# Patient Record
Sex: FEMALE | Race: WHITE | NOT HISPANIC OR LATINO | Employment: OTHER | URBAN - METROPOLITAN AREA
[De-identification: names, ages, dates, MRNs, and addresses within clinical notes are randomized per-mention and may not be internally consistent; named-entity substitution may affect disease eponyms.]

---

## 2024-02-25 ENCOUNTER — APPOINTMENT (OUTPATIENT)
Dept: CARDIOLOGY | Facility: HOSPITAL | Age: 84
End: 2024-02-25
Payer: MEDICARE

## 2024-02-25 ENCOUNTER — HOSPITAL ENCOUNTER (OUTPATIENT)
Facility: HOSPITAL | Age: 84
Setting detail: OBSERVATION
Discharge: HOME | End: 2024-02-27
Attending: STUDENT IN AN ORGANIZED HEALTH CARE EDUCATION/TRAINING PROGRAM | Admitting: INTERNAL MEDICINE
Payer: MEDICARE

## 2024-02-25 ENCOUNTER — APPOINTMENT (OUTPATIENT)
Dept: RADIOLOGY | Facility: HOSPITAL | Age: 84
End: 2024-02-25
Payer: MEDICARE

## 2024-02-25 DIAGNOSIS — R79.89 ELEVATED TROPONIN: ICD-10-CM

## 2024-02-25 DIAGNOSIS — R07.9 CHEST PAIN, UNSPECIFIED TYPE: Primary | ICD-10-CM

## 2024-02-25 DIAGNOSIS — I21.4 NSTEMI, INITIAL EPISODE OF CARE (MULTI): ICD-10-CM

## 2024-02-25 PROBLEM — N28.1 RENAL CYST: Status: ACTIVE | Noted: 2024-02-25

## 2024-02-25 PROBLEM — F32.A ANXIETY AND DEPRESSION: Status: ACTIVE | Noted: 2024-02-25

## 2024-02-25 PROBLEM — F41.9 ANXIETY AND DEPRESSION: Status: ACTIVE | Noted: 2024-02-25

## 2024-02-25 PROBLEM — I42.9 CARDIOMYOPATHY (MULTI): Status: ACTIVE | Noted: 2023-11-15

## 2024-02-25 PROBLEM — I35.0 AORTIC VALVE STENOSIS: Status: ACTIVE | Noted: 2022-05-18

## 2024-02-25 PROBLEM — N18.32 STAGE 3B CHRONIC KIDNEY DISEASE (MULTI): Status: ACTIVE | Noted: 2022-05-18

## 2024-02-25 PROBLEM — K57.90 DIVERTICULOSIS: Status: ACTIVE | Noted: 2023-10-12

## 2024-02-25 PROBLEM — I25.10 CORONARY ARTERY DISEASE INVOLVING NATIVE CORONARY ARTERY OF NATIVE HEART WITHOUT ANGINA PECTORIS: Status: ACTIVE | Noted: 2023-11-15

## 2024-02-25 LAB
ALBUMIN SERPL BCP-MCNC: 4.4 G/DL (ref 3.4–5)
ALP SERPL-CCNC: 71 U/L (ref 33–136)
ALT SERPL W P-5'-P-CCNC: 25 U/L (ref 7–45)
ANION GAP SERPL CALC-SCNC: 15 MMOL/L (ref 10–20)
AST SERPL W P-5'-P-CCNC: 25 U/L (ref 9–39)
BASOPHILS # BLD AUTO: 0.02 X10*3/UL (ref 0–0.1)
BASOPHILS NFR BLD AUTO: 0.3 %
BILIRUB SERPL-MCNC: 0.6 MG/DL (ref 0–1.2)
BNP SERPL-MCNC: 538 PG/ML (ref 0–99)
BUN SERPL-MCNC: 27 MG/DL (ref 6–23)
CALCIUM SERPL-MCNC: 10.5 MG/DL (ref 8.6–10.3)
CARDIAC TROPONIN I PNL SERPL HS: 16 NG/L (ref 0–13)
CARDIAC TROPONIN I PNL SERPL HS: 18 NG/L (ref 0–13)
CHLORIDE SERPL-SCNC: 103 MMOL/L (ref 98–107)
CO2 SERPL-SCNC: 22 MMOL/L (ref 21–32)
CREAT SERPL-MCNC: 1.18 MG/DL (ref 0.5–1.05)
EGFRCR SERPLBLD CKD-EPI 2021: 45 ML/MIN/1.73M*2
EOSINOPHIL # BLD AUTO: 0.11 X10*3/UL (ref 0–0.4)
EOSINOPHIL NFR BLD AUTO: 1.4 %
ERYTHROCYTE [DISTWIDTH] IN BLOOD BY AUTOMATED COUNT: 12.5 % (ref 11.5–14.5)
FLUAV RNA RESP QL NAA+PROBE: NOT DETECTED
FLUBV RNA RESP QL NAA+PROBE: NOT DETECTED
GLUCOSE SERPL-MCNC: 118 MG/DL (ref 74–99)
HCT VFR BLD AUTO: 40.6 % (ref 36–46)
HGB BLD-MCNC: 13.5 G/DL (ref 12–16)
IMM GRANULOCYTES # BLD AUTO: 0.01 X10*3/UL (ref 0–0.5)
IMM GRANULOCYTES NFR BLD AUTO: 0.1 % (ref 0–0.9)
INR PPP: 1.1 (ref 0.9–1.1)
LIPASE SERPL-CCNC: 26 U/L (ref 9–82)
LYMPHOCYTES # BLD AUTO: 1.18 X10*3/UL (ref 0.8–3)
LYMPHOCYTES NFR BLD AUTO: 15.4 %
MAGNESIUM SERPL-MCNC: 1.79 MG/DL (ref 1.6–2.4)
MCH RBC QN AUTO: 29.1 PG (ref 26–34)
MCHC RBC AUTO-ENTMCNC: 33.3 G/DL (ref 32–36)
MCV RBC AUTO: 88 FL (ref 80–100)
MONOCYTES # BLD AUTO: 0.46 X10*3/UL (ref 0.05–0.8)
MONOCYTES NFR BLD AUTO: 6 %
NEUTROPHILS # BLD AUTO: 5.87 X10*3/UL (ref 1.6–5.5)
NEUTROPHILS NFR BLD AUTO: 76.8 %
NRBC BLD-RTO: 0 /100 WBCS (ref 0–0)
PLATELET # BLD AUTO: 223 X10*3/UL (ref 150–450)
POTASSIUM SERPL-SCNC: 4.2 MMOL/L (ref 3.5–5.3)
PROT SERPL-MCNC: 7.5 G/DL (ref 6.4–8.2)
PROTHROMBIN TIME: 12.4 SECONDS (ref 9.8–12.8)
RBC # BLD AUTO: 4.64 X10*6/UL (ref 4–5.2)
SARS-COV-2 RNA RESP QL NAA+PROBE: NOT DETECTED
SODIUM SERPL-SCNC: 136 MMOL/L (ref 136–145)
WBC # BLD AUTO: 7.7 X10*3/UL (ref 4.4–11.3)

## 2024-02-25 PROCEDURE — 80053 COMPREHEN METABOLIC PANEL: CPT | Performed by: STUDENT IN AN ORGANIZED HEALTH CARE EDUCATION/TRAINING PROGRAM

## 2024-02-25 PROCEDURE — 2500000001 HC RX 250 WO HCPCS SELF ADMINISTERED DRUGS (ALT 637 FOR MEDICARE OP): Performed by: STUDENT IN AN ORGANIZED HEALTH CARE EDUCATION/TRAINING PROGRAM

## 2024-02-25 PROCEDURE — 99285 EMERGENCY DEPT VISIT HI MDM: CPT | Mod: 25

## 2024-02-25 PROCEDURE — 93005 ELECTROCARDIOGRAM TRACING: CPT

## 2024-02-25 PROCEDURE — 96375 TX/PRO/DX INJ NEW DRUG ADDON: CPT

## 2024-02-25 PROCEDURE — 2500000004 HC RX 250 GENERAL PHARMACY W/ HCPCS (ALT 636 FOR OP/ED): Performed by: INTERNAL MEDICINE

## 2024-02-25 PROCEDURE — G0378 HOSPITAL OBSERVATION PER HR: HCPCS

## 2024-02-25 PROCEDURE — 36415 COLL VENOUS BLD VENIPUNCTURE: CPT | Performed by: STUDENT IN AN ORGANIZED HEALTH CARE EDUCATION/TRAINING PROGRAM

## 2024-02-25 PROCEDURE — 85610 PROTHROMBIN TIME: CPT | Performed by: STUDENT IN AN ORGANIZED HEALTH CARE EDUCATION/TRAINING PROGRAM

## 2024-02-25 PROCEDURE — 83690 ASSAY OF LIPASE: CPT | Performed by: STUDENT IN AN ORGANIZED HEALTH CARE EDUCATION/TRAINING PROGRAM

## 2024-02-25 PROCEDURE — 84484 ASSAY OF TROPONIN QUANT: CPT | Performed by: STUDENT IN AN ORGANIZED HEALTH CARE EDUCATION/TRAINING PROGRAM

## 2024-02-25 PROCEDURE — 99223 1ST HOSP IP/OBS HIGH 75: CPT | Performed by: INTERNAL MEDICINE

## 2024-02-25 PROCEDURE — 87636 SARSCOV2 & INF A&B AMP PRB: CPT | Performed by: STUDENT IN AN ORGANIZED HEALTH CARE EDUCATION/TRAINING PROGRAM

## 2024-02-25 PROCEDURE — 83880 ASSAY OF NATRIURETIC PEPTIDE: CPT | Performed by: STUDENT IN AN ORGANIZED HEALTH CARE EDUCATION/TRAINING PROGRAM

## 2024-02-25 PROCEDURE — 71045 X-RAY EXAM CHEST 1 VIEW: CPT

## 2024-02-25 PROCEDURE — 71045 X-RAY EXAM CHEST 1 VIEW: CPT | Mod: FOREIGN READ | Performed by: RADIOLOGY

## 2024-02-25 PROCEDURE — 85025 COMPLETE CBC W/AUTO DIFF WBC: CPT | Performed by: STUDENT IN AN ORGANIZED HEALTH CARE EDUCATION/TRAINING PROGRAM

## 2024-02-25 PROCEDURE — 83735 ASSAY OF MAGNESIUM: CPT | Performed by: STUDENT IN AN ORGANIZED HEALTH CARE EDUCATION/TRAINING PROGRAM

## 2024-02-25 RX ORDER — PANTOPRAZOLE SODIUM 40 MG/1
40 TABLET, DELAYED RELEASE ORAL DAILY
Status: DISCONTINUED | OUTPATIENT
Start: 2024-02-26 | End: 2024-02-27 | Stop reason: HOSPADM

## 2024-02-25 RX ORDER — NAPROXEN SODIUM 220 MG/1
1 TABLET, FILM COATED ORAL
COMMUNITY
Start: 2023-05-23

## 2024-02-25 RX ORDER — FLUCONAZOLE 200 MG/1
1 TABLET ORAL DAILY
Status: ON HOLD | COMMUNITY
End: 2024-02-26 | Stop reason: ALTCHOICE

## 2024-02-25 RX ORDER — AMLODIPINE BESYLATE 5 MG/1
1 TABLET ORAL DAILY
Status: ON HOLD | COMMUNITY
End: 2024-02-26 | Stop reason: SDUPTHER

## 2024-02-25 RX ORDER — NAPROXEN SODIUM 220 MG/1
81 TABLET, FILM COATED ORAL
Status: DISCONTINUED | OUTPATIENT
Start: 2024-02-26 | End: 2024-02-27 | Stop reason: HOSPADM

## 2024-02-25 RX ORDER — HEPARIN SODIUM 5000 [USP'U]/ML
60 INJECTION, SOLUTION INTRAVENOUS; SUBCUTANEOUS ONCE
Status: COMPLETED | OUTPATIENT
Start: 2024-02-25 | End: 2024-02-25

## 2024-02-25 RX ORDER — AMLODIPINE BESYLATE 2.5 MG/1
1 TABLET ORAL NIGHTLY
COMMUNITY
Start: 2022-07-18

## 2024-02-25 RX ORDER — SPIRONOLACTONE 50 MG/1
1 TABLET, FILM COATED ORAL 2 TIMES DAILY
Status: ON HOLD | COMMUNITY
End: 2024-02-26 | Stop reason: ALTCHOICE

## 2024-02-25 RX ORDER — HYDRALAZINE HYDROCHLORIDE 20 MG/ML
10 INJECTION INTRAMUSCULAR; INTRAVENOUS EVERY 4 HOURS PRN
Status: DISCONTINUED | OUTPATIENT
Start: 2024-02-25 | End: 2024-02-27 | Stop reason: HOSPADM

## 2024-02-25 RX ORDER — MAGNESIUM SULFATE 1 G/100ML
1 INJECTION INTRAVENOUS ONCE
Status: COMPLETED | OUTPATIENT
Start: 2024-02-25 | End: 2024-02-26

## 2024-02-25 RX ORDER — HEPARIN SODIUM 10000 [USP'U]/100ML
0-4000 INJECTION, SOLUTION INTRAVENOUS CONTINUOUS
Status: DISCONTINUED | OUTPATIENT
Start: 2024-02-25 | End: 2024-02-26

## 2024-02-25 RX ORDER — ONDANSETRON HYDROCHLORIDE 2 MG/ML
4 INJECTION, SOLUTION INTRAVENOUS EVERY 8 HOURS PRN
Status: DISCONTINUED | OUTPATIENT
Start: 2024-02-25 | End: 2024-02-27 | Stop reason: HOSPADM

## 2024-02-25 RX ORDER — HYDROXYZINE HYDROCHLORIDE 25 MG/1
1 TABLET, FILM COATED ORAL NIGHTLY
COMMUNITY

## 2024-02-25 RX ORDER — SPIRONOLACTONE 25 MG/1
50 TABLET ORAL 2 TIMES DAILY
Status: DISCONTINUED | OUTPATIENT
Start: 2024-02-25 | End: 2024-02-26

## 2024-02-25 RX ORDER — VENLAFAXINE HYDROCHLORIDE 150 MG/1
1 CAPSULE, EXTENDED RELEASE ORAL DAILY
Status: ON HOLD | COMMUNITY
End: 2024-02-26 | Stop reason: ALTCHOICE

## 2024-02-25 RX ORDER — PRAVASTATIN SODIUM 10 MG/1
10 TABLET ORAL NIGHTLY
Status: DISCONTINUED | OUTPATIENT
Start: 2024-02-25 | End: 2024-02-26

## 2024-02-25 RX ORDER — ACETAMINOPHEN 160 MG/5ML
650 SOLUTION ORAL EVERY 4 HOURS PRN
Status: DISCONTINUED | OUTPATIENT
Start: 2024-02-25 | End: 2024-02-27 | Stop reason: HOSPADM

## 2024-02-25 RX ORDER — PRAVASTATIN SODIUM 10 MG/1
1 TABLET ORAL NIGHTLY
Status: ON HOLD | COMMUNITY
End: 2024-02-26 | Stop reason: ALTCHOICE

## 2024-02-25 RX ORDER — TALC
3 POWDER (GRAM) TOPICAL DAILY
Status: DISCONTINUED | OUTPATIENT
Start: 2024-02-25 | End: 2024-02-27 | Stop reason: HOSPADM

## 2024-02-25 RX ORDER — PANTOPRAZOLE SODIUM 40 MG/10ML
40 INJECTION, POWDER, LYOPHILIZED, FOR SOLUTION INTRAVENOUS DAILY
Status: DISCONTINUED | OUTPATIENT
Start: 2024-02-26 | End: 2024-02-27 | Stop reason: HOSPADM

## 2024-02-25 RX ORDER — PANTOPRAZOLE SODIUM 40 MG/1
40 TABLET, DELAYED RELEASE ORAL
Status: ON HOLD | COMMUNITY
Start: 2023-05-22 | End: 2024-02-26 | Stop reason: ALTCHOICE

## 2024-02-25 RX ORDER — ONDANSETRON 4 MG/1
4 TABLET, FILM COATED ORAL EVERY 8 HOURS PRN
Status: DISCONTINUED | OUTPATIENT
Start: 2024-02-25 | End: 2024-02-27 | Stop reason: HOSPADM

## 2024-02-25 RX ORDER — LOSARTAN POTASSIUM 25 MG/1
25 TABLET ORAL
Status: DISCONTINUED | OUTPATIENT
Start: 2024-02-26 | End: 2024-02-27 | Stop reason: HOSPADM

## 2024-02-25 RX ORDER — LORAZEPAM 1 MG/1
1 TABLET ORAL 2 TIMES DAILY PRN
Status: DISCONTINUED | OUTPATIENT
Start: 2024-02-25 | End: 2024-02-27 | Stop reason: HOSPADM

## 2024-02-25 RX ORDER — ACETAMINOPHEN 650 MG/1
650 SUPPOSITORY RECTAL EVERY 4 HOURS PRN
Status: DISCONTINUED | OUTPATIENT
Start: 2024-02-25 | End: 2024-02-27 | Stop reason: HOSPADM

## 2024-02-25 RX ORDER — LOSARTAN POTASSIUM 25 MG/1
25 TABLET ORAL
COMMUNITY
Start: 2024-01-17 | End: 2025-01-16

## 2024-02-25 RX ORDER — METOPROLOL SUCCINATE 50 MG/1
50 TABLET, EXTENDED RELEASE ORAL ONCE
Status: COMPLETED | OUTPATIENT
Start: 2024-02-25 | End: 2024-02-25

## 2024-02-25 RX ORDER — AMLODIPINE BESYLATE 2.5 MG/1
2.5 TABLET ORAL
Status: ON HOLD | COMMUNITY
Start: 2024-01-12 | End: 2024-02-26 | Stop reason: SDUPTHER

## 2024-02-25 RX ORDER — AMLODIPINE BESYLATE 5 MG/1
2.5 TABLET ORAL ONCE
Status: COMPLETED | OUTPATIENT
Start: 2024-02-25 | End: 2024-02-25

## 2024-02-25 RX ORDER — POLYETHYLENE GLYCOL 3350 17 G/17G
17 POWDER, FOR SOLUTION ORAL DAILY
Status: DISCONTINUED | OUTPATIENT
Start: 2024-02-25 | End: 2024-02-27 | Stop reason: HOSPADM

## 2024-02-25 RX ORDER — HEPARIN SODIUM 5000 [USP'U]/ML
INJECTION, SOLUTION INTRAVENOUS; SUBCUTANEOUS EVERY 4 HOURS PRN
Status: DISCONTINUED | OUTPATIENT
Start: 2024-02-25 | End: 2024-02-26

## 2024-02-25 RX ORDER — EZETIMIBE 10 MG/1
10 TABLET ORAL
Status: DISCONTINUED | OUTPATIENT
Start: 2024-02-26 | End: 2024-02-27 | Stop reason: HOSPADM

## 2024-02-25 RX ORDER — EZETIMIBE 10 MG/1
10 TABLET ORAL
COMMUNITY
Start: 2024-01-09

## 2024-02-25 RX ORDER — ACETAMINOPHEN 325 MG/1
650 TABLET ORAL EVERY 4 HOURS PRN
Status: DISCONTINUED | OUTPATIENT
Start: 2024-02-25 | End: 2024-02-27 | Stop reason: HOSPADM

## 2024-02-25 RX ORDER — LORAZEPAM 1 MG/1
1 TABLET ORAL 2 TIMES DAILY PRN
COMMUNITY
Start: 2023-06-20

## 2024-02-25 RX ORDER — FINASTERIDE 5 MG/1
5 TABLET, FILM COATED ORAL DAILY
Status: DISCONTINUED | OUTPATIENT
Start: 2024-02-25 | End: 2024-02-26

## 2024-02-25 RX ORDER — AMLODIPINE BESYLATE 5 MG/1
10 TABLET ORAL NIGHTLY
Status: DISCONTINUED | OUTPATIENT
Start: 2024-02-25 | End: 2024-02-27 | Stop reason: HOSPADM

## 2024-02-25 RX ORDER — VENLAFAXINE HYDROCHLORIDE 150 MG/1
150 CAPSULE, EXTENDED RELEASE ORAL DAILY
Status: DISCONTINUED | OUTPATIENT
Start: 2024-02-25 | End: 2024-02-26

## 2024-02-25 RX ORDER — HYDROXYZINE HYDROCHLORIDE 25 MG/1
25 TABLET, FILM COATED ORAL NIGHTLY
Status: DISCONTINUED | OUTPATIENT
Start: 2024-02-25 | End: 2024-02-27

## 2024-02-25 RX ORDER — FINASTERIDE 5 MG/1
1 TABLET, FILM COATED ORAL DAILY
Status: ON HOLD | COMMUNITY
End: 2024-02-26 | Stop reason: ALTCHOICE

## 2024-02-25 RX ORDER — CLINDAMYCIN HYDROCHLORIDE 300 MG/1
1 CAPSULE ORAL 4 TIMES DAILY
Status: ON HOLD | COMMUNITY
End: 2024-02-26 | Stop reason: ALTCHOICE

## 2024-02-25 RX ORDER — METOPROLOL SUCCINATE 50 MG/1
50 TABLET, EXTENDED RELEASE ORAL
COMMUNITY
Start: 2024-01-05 | End: 2024-02-27 | Stop reason: HOSPADM

## 2024-02-25 RX ORDER — BUSPIRONE HYDROCHLORIDE 15 MG/1
15 TABLET ORAL 2 TIMES DAILY
COMMUNITY
Start: 2023-10-12

## 2024-02-25 RX ORDER — NAPROXEN SODIUM 220 MG/1
324 TABLET, FILM COATED ORAL ONCE
Status: COMPLETED | OUTPATIENT
Start: 2024-02-25 | End: 2024-02-25

## 2024-02-25 RX ORDER — LOSARTAN POTASSIUM 25 MG/1
25 TABLET ORAL ONCE
Status: COMPLETED | OUTPATIENT
Start: 2024-02-25 | End: 2024-02-25

## 2024-02-25 RX ORDER — BUSPIRONE HYDROCHLORIDE 5 MG/1
15 TABLET ORAL 2 TIMES DAILY
Status: DISCONTINUED | OUTPATIENT
Start: 2024-02-25 | End: 2024-02-27 | Stop reason: HOSPADM

## 2024-02-25 RX ORDER — METOPROLOL SUCCINATE 50 MG/1
50 TABLET, EXTENDED RELEASE ORAL
Status: DISCONTINUED | OUTPATIENT
Start: 2024-02-26 | End: 2024-02-26

## 2024-02-25 RX ORDER — DULOXETIN HYDROCHLORIDE 20 MG/1
20 CAPSULE, DELAYED RELEASE ORAL
COMMUNITY
Start: 2024-01-05 | End: 2024-02-27 | Stop reason: HOSPADM

## 2024-02-25 RX ADMIN — LOSARTAN POTASSIUM 25 MG: 25 TABLET, FILM COATED ORAL at 19:50

## 2024-02-25 RX ADMIN — AMLODIPINE BESYLATE 2.5 MG: 5 TABLET ORAL at 19:50

## 2024-02-25 RX ADMIN — ASPIRIN 324 MG: 81 TABLET, CHEWABLE ORAL at 20:54

## 2024-02-25 RX ADMIN — HEPARIN SODIUM 3550 UNITS: 5000 INJECTION INTRAVENOUS; SUBCUTANEOUS at 21:00

## 2024-02-25 RX ADMIN — METOPROLOL SUCCINATE 50 MG: 50 TABLET, EXTENDED RELEASE ORAL at 19:50

## 2024-02-25 ASSESSMENT — HEART SCORE
TROPONIN: 1-3 TIMES NORMAL LIMIT
HISTORY: SLIGHTLY SUSPICIOUS
RISK FACTORS: >2 RISK FACTORS OR HX OF ATHEROSCLEROTIC DISEASE
ECG: NORMAL
AGE: 65+
HEART SCORE: 5

## 2024-02-25 ASSESSMENT — LIFESTYLE VARIABLES
EVER HAD A DRINK FIRST THING IN THE MORNING TO STEADY YOUR NERVES TO GET RID OF A HANGOVER: NO
HAVE YOU EVER FELT YOU SHOULD CUT DOWN ON YOUR DRINKING: NO
EVER FELT BAD OR GUILTY ABOUT YOUR DRINKING: NO
HAVE PEOPLE ANNOYED YOU BY CRITICIZING YOUR DRINKING: NO

## 2024-02-25 ASSESSMENT — COLUMBIA-SUICIDE SEVERITY RATING SCALE - C-SSRS
1. IN THE PAST MONTH, HAVE YOU WISHED YOU WERE DEAD OR WISHED YOU COULD GO TO SLEEP AND NOT WAKE UP?: NO
6. HAVE YOU EVER DONE ANYTHING, STARTED TO DO ANYTHING, OR PREPARED TO DO ANYTHING TO END YOUR LIFE?: NO
2. HAVE YOU ACTUALLY HAD ANY THOUGHTS OF KILLING YOURSELF?: NO

## 2024-02-25 ASSESSMENT — PAIN - FUNCTIONAL ASSESSMENT: PAIN_FUNCTIONAL_ASSESSMENT: 0-10

## 2024-02-25 ASSESSMENT — PAIN SCALES - GENERAL: PAINLEVEL_OUTOF10: 0 - NO PAIN

## 2024-02-26 ENCOUNTER — APPOINTMENT (OUTPATIENT)
Dept: CARDIOLOGY | Facility: HOSPITAL | Age: 84
End: 2024-02-26
Payer: MEDICARE

## 2024-02-26 LAB
ALBUMIN SERPL BCP-MCNC: 3.7 G/DL (ref 3.4–5)
ALP SERPL-CCNC: 62 U/L (ref 33–136)
ALT SERPL W P-5'-P-CCNC: 19 U/L (ref 7–45)
ANION GAP SERPL CALC-SCNC: 11 MMOL/L (ref 10–20)
AST SERPL W P-5'-P-CCNC: 20 U/L (ref 9–39)
BILIRUB SERPL-MCNC: 0.4 MG/DL (ref 0–1.2)
BUN SERPL-MCNC: 27 MG/DL (ref 6–23)
CALCIUM SERPL-MCNC: 9.3 MG/DL (ref 8.6–10.3)
CARDIAC TROPONIN I PNL SERPL HS: 17 NG/L (ref 0–13)
CHLORIDE SERPL-SCNC: 105 MMOL/L (ref 98–107)
CO2 SERPL-SCNC: 24 MMOL/L (ref 21–32)
CREAT SERPL-MCNC: 1.2 MG/DL (ref 0.5–1.05)
EGFRCR SERPLBLD CKD-EPI 2021: 44 ML/MIN/1.73M*2
ERYTHROCYTE [DISTWIDTH] IN BLOOD BY AUTOMATED COUNT: 12.5 % (ref 11.5–14.5)
GLUCOSE SERPL-MCNC: 124 MG/DL (ref 74–99)
HCT VFR BLD AUTO: 35.5 % (ref 36–46)
HGB BLD-MCNC: 11.7 G/DL (ref 12–16)
HOLD SPECIMEN: NORMAL
MCH RBC QN AUTO: 28.8 PG (ref 26–34)
MCHC RBC AUTO-ENTMCNC: 33 G/DL (ref 32–36)
MCV RBC AUTO: 87 FL (ref 80–100)
NRBC BLD-RTO: 0 /100 WBCS (ref 0–0)
PLATELET # BLD AUTO: 192 X10*3/UL (ref 150–450)
POTASSIUM SERPL-SCNC: 3.5 MMOL/L (ref 3.5–5.3)
PROT SERPL-MCNC: 6.3 G/DL (ref 6.4–8.2)
RBC # BLD AUTO: 4.06 X10*6/UL (ref 4–5.2)
SODIUM SERPL-SCNC: 136 MMOL/L (ref 136–145)
UFH PPP CHRO-ACNC: 0.3 IU/ML
UFH PPP CHRO-ACNC: 0.5 IU/ML
WBC # BLD AUTO: 5.2 X10*3/UL (ref 4.4–11.3)

## 2024-02-26 PROCEDURE — 84484 ASSAY OF TROPONIN QUANT: CPT | Performed by: HOSPITALIST

## 2024-02-26 PROCEDURE — 96365 THER/PROPH/DIAG IV INF INIT: CPT

## 2024-02-26 PROCEDURE — 36415 COLL VENOUS BLD VENIPUNCTURE: CPT | Performed by: INTERNAL MEDICINE

## 2024-02-26 PROCEDURE — 2500000004 HC RX 250 GENERAL PHARMACY W/ HCPCS (ALT 636 FOR OP/ED): Performed by: INTERNAL MEDICINE

## 2024-02-26 PROCEDURE — 96375 TX/PRO/DX INJ NEW DRUG ADDON: CPT

## 2024-02-26 PROCEDURE — 96366 THER/PROPH/DIAG IV INF ADDON: CPT

## 2024-02-26 PROCEDURE — 2500000001 HC RX 250 WO HCPCS SELF ADMINISTERED DRUGS (ALT 637 FOR MEDICARE OP): Performed by: NURSE PRACTITIONER

## 2024-02-26 PROCEDURE — 2500000004 HC RX 250 GENERAL PHARMACY W/ HCPCS (ALT 636 FOR OP/ED): Performed by: NURSE PRACTITIONER

## 2024-02-26 PROCEDURE — 85520 HEPARIN ASSAY: CPT | Performed by: INTERNAL MEDICINE

## 2024-02-26 PROCEDURE — 2500000001 HC RX 250 WO HCPCS SELF ADMINISTERED DRUGS (ALT 637 FOR MEDICARE OP): Performed by: INTERNAL MEDICINE

## 2024-02-26 PROCEDURE — 2500000002 HC RX 250 W HCPCS SELF ADMINISTERED DRUGS (ALT 637 FOR MEDICARE OP, ALT 636 FOR OP/ED): Performed by: HOSPITALIST

## 2024-02-26 PROCEDURE — 99223 1ST HOSP IP/OBS HIGH 75: CPT | Performed by: INTERNAL MEDICINE

## 2024-02-26 PROCEDURE — 93005 ELECTROCARDIOGRAM TRACING: CPT

## 2024-02-26 PROCEDURE — 80053 COMPREHEN METABOLIC PANEL: CPT | Performed by: INTERNAL MEDICINE

## 2024-02-26 PROCEDURE — 99232 SBSQ HOSP IP/OBS MODERATE 35: CPT | Performed by: HOSPITALIST

## 2024-02-26 PROCEDURE — 96376 TX/PRO/DX INJ SAME DRUG ADON: CPT

## 2024-02-26 PROCEDURE — G0378 HOSPITAL OBSERVATION PER HR: HCPCS

## 2024-02-26 PROCEDURE — 85027 COMPLETE CBC AUTOMATED: CPT | Performed by: INTERNAL MEDICINE

## 2024-02-26 PROCEDURE — C9113 INJ PANTOPRAZOLE SODIUM, VIA: HCPCS | Performed by: INTERNAL MEDICINE

## 2024-02-26 PROCEDURE — 93010 ELECTROCARDIOGRAM REPORT: CPT | Performed by: INTERNAL MEDICINE

## 2024-02-26 PROCEDURE — 2500000001 HC RX 250 WO HCPCS SELF ADMINISTERED DRUGS (ALT 637 FOR MEDICARE OP): Performed by: HOSPITALIST

## 2024-02-26 PROCEDURE — 2500000002 HC RX 250 W HCPCS SELF ADMINISTERED DRUGS (ALT 637 FOR MEDICARE OP, ALT 636 FOR OP/ED): Performed by: INTERNAL MEDICINE

## 2024-02-26 RX ORDER — SPIRONOLACTONE 25 MG/1
12.5 TABLET ORAL
Status: DISCONTINUED | OUTPATIENT
Start: 2024-02-26 | End: 2024-02-27 | Stop reason: HOSPADM

## 2024-02-26 RX ORDER — METOPROLOL SUCCINATE 25 MG/1
25 TABLET, EXTENDED RELEASE ORAL
Status: DISCONTINUED | OUTPATIENT
Start: 2024-02-27 | End: 2024-02-27 | Stop reason: HOSPADM

## 2024-02-26 RX ORDER — AMINOPHYLLINE 25 MG/ML
125 INJECTION, SOLUTION INTRAVENOUS AS NEEDED
Status: CANCELLED | OUTPATIENT
Start: 2024-02-26

## 2024-02-26 RX ORDER — METOPROLOL SUCCINATE 25 MG/1
25 TABLET, EXTENDED RELEASE ORAL ONCE
Status: DISCONTINUED | OUTPATIENT
Start: 2024-02-26 | End: 2024-02-27 | Stop reason: HOSPADM

## 2024-02-26 RX ORDER — DULOXETIN HYDROCHLORIDE 20 MG/1
20 CAPSULE, DELAYED RELEASE ORAL DAILY
Status: DISCONTINUED | OUTPATIENT
Start: 2024-02-26 | End: 2024-02-27 | Stop reason: HOSPADM

## 2024-02-26 RX ORDER — NAPROXEN SODIUM 220 MG/1
81 TABLET, FILM COATED ORAL ONCE
Status: COMPLETED | OUTPATIENT
Start: 2024-02-26 | End: 2024-02-26

## 2024-02-26 RX ORDER — EZETIMIBE 10 MG/1
10 TABLET ORAL ONCE
Status: COMPLETED | OUTPATIENT
Start: 2024-02-26 | End: 2024-02-26

## 2024-02-26 RX ORDER — LOSARTAN POTASSIUM 25 MG/1
25 TABLET ORAL ONCE
Status: COMPLETED | OUTPATIENT
Start: 2024-02-26 | End: 2024-02-26

## 2024-02-26 RX ORDER — METOPROLOL SUCCINATE 25 MG/1
25 TABLET, EXTENDED RELEASE ORAL ONCE
Status: COMPLETED | OUTPATIENT
Start: 2024-02-26 | End: 2024-02-26

## 2024-02-26 RX ORDER — SPIRONOLACTONE 25 MG/1
12.5 TABLET ORAL ONCE
Status: COMPLETED | OUTPATIENT
Start: 2024-02-26 | End: 2024-02-26

## 2024-02-26 RX ORDER — ROSUVASTATIN CALCIUM 10 MG/1
10 TABLET, COATED ORAL NIGHTLY
Status: DISCONTINUED | OUTPATIENT
Start: 2024-02-26 | End: 2024-02-27 | Stop reason: HOSPADM

## 2024-02-26 RX ORDER — ISOSORBIDE MONONITRATE 30 MG/1
30 TABLET, EXTENDED RELEASE ORAL DAILY
Status: DISCONTINUED | OUTPATIENT
Start: 2024-02-26 | End: 2024-02-27 | Stop reason: HOSPADM

## 2024-02-26 RX ORDER — DULOXETIN HYDROCHLORIDE 20 MG/1
20 CAPSULE, DELAYED RELEASE ORAL ONCE
Status: COMPLETED | OUTPATIENT
Start: 2024-02-26 | End: 2024-02-26

## 2024-02-26 RX ORDER — MAGNESIUM SULFATE HEPTAHYDRATE 40 MG/ML
2 INJECTION, SOLUTION INTRAVENOUS ONCE
Status: COMPLETED | OUTPATIENT
Start: 2024-02-26 | End: 2024-02-26

## 2024-02-26 RX ORDER — ROSUVASTATIN CALCIUM 10 MG/1
10 TABLET, COATED ORAL DAILY
COMMUNITY

## 2024-02-26 RX ADMIN — AMLODIPINE BESYLATE 10 MG: 5 TABLET ORAL at 21:58

## 2024-02-26 RX ADMIN — DULOXETINE HYDROCHLORIDE 20 MG: 20 CAPSULE, DELAYED RELEASE ORAL at 21:58

## 2024-02-26 RX ADMIN — ACETAMINOPHEN 650 MG: 325 TABLET ORAL at 21:58

## 2024-02-26 RX ADMIN — MAGNESIUM SULFATE HEPTAHYDRATE 2 G: 40 INJECTION, SOLUTION INTRAVENOUS at 09:52

## 2024-02-26 RX ADMIN — ACETAMINOPHEN 650 MG: 325 TABLET ORAL at 10:09

## 2024-02-26 RX ADMIN — ROSUVASTATIN CALCIUM 10 MG: 10 TABLET, FILM COATED ORAL at 21:58

## 2024-02-26 RX ADMIN — LOSARTAN POTASSIUM 25 MG: 25 TABLET, FILM COATED ORAL at 21:57

## 2024-02-26 RX ADMIN — MAGNESIUM SULFATE HEPTAHYDRATE 1 G: 1 INJECTION, SOLUTION INTRAVENOUS at 04:49

## 2024-02-26 RX ADMIN — SPIRONOLACTONE 12.5 MG: 25 TABLET ORAL at 21:58

## 2024-02-26 RX ADMIN — BUSPIRONE HYDROCHLORIDE 15 MG: 5 TABLET ORAL at 00:36

## 2024-02-26 RX ADMIN — HYDROXYZINE HYDROCHLORIDE 25 MG: 25 TABLET ORAL at 21:58

## 2024-02-26 RX ADMIN — METOPROLOL SUCCINATE 25 MG: 25 TABLET, EXTENDED RELEASE ORAL at 22:01

## 2024-02-26 RX ADMIN — PANTOPRAZOLE SODIUM 40 MG: 40 INJECTION, POWDER, FOR SOLUTION INTRAVENOUS at 06:42

## 2024-02-26 RX ADMIN — ASPIRIN 81 MG: 81 TABLET, CHEWABLE ORAL at 21:58

## 2024-02-26 RX ADMIN — HEPARIN SODIUM 700 UNITS/HR: 10000 INJECTION, SOLUTION INTRAVENOUS at 00:35

## 2024-02-26 RX ADMIN — EZETIMIBE 10 MG: 10 TABLET ORAL at 21:57

## 2024-02-26 RX ADMIN — BUSPIRONE HYDROCHLORIDE 15 MG: 5 TABLET ORAL at 21:57

## 2024-02-26 RX ADMIN — ISOSORBIDE MONONITRATE 30 MG: 30 TABLET, EXTENDED RELEASE ORAL at 21:57

## 2024-02-26 SDOH — SOCIAL STABILITY: SOCIAL INSECURITY: DO YOU FEEL ANYONE HAS EXPLOITED OR TAKEN ADVANTAGE OF YOU FINANCIALLY OR OF YOUR PERSONAL PROPERTY?: NO

## 2024-02-26 SDOH — SOCIAL STABILITY: SOCIAL INSECURITY: ARE YOU OR HAVE YOU BEEN THREATENED OR ABUSED PHYSICALLY, EMOTIONALLY, OR SEXUALLY BY ANYONE?: NO

## 2024-02-26 SDOH — SOCIAL STABILITY: SOCIAL INSECURITY: DO YOU FEEL UNSAFE GOING BACK TO THE PLACE WHERE YOU ARE LIVING?: NO

## 2024-02-26 SDOH — SOCIAL STABILITY: SOCIAL INSECURITY: WERE YOU ABLE TO COMPLETE ALL THE BEHAVIORAL HEALTH SCREENINGS?: YES

## 2024-02-26 SDOH — SOCIAL STABILITY: SOCIAL INSECURITY: ARE THERE ANY APPARENT SIGNS OF INJURIES/BEHAVIORS THAT COULD BE RELATED TO ABUSE/NEGLECT?: NO

## 2024-02-26 SDOH — SOCIAL STABILITY: SOCIAL INSECURITY: ABUSE: ADULT

## 2024-02-26 SDOH — SOCIAL STABILITY: SOCIAL INSECURITY: HAS ANYONE EVER THREATENED TO HURT YOUR FAMILY OR YOUR PETS?: NO

## 2024-02-26 SDOH — SOCIAL STABILITY: SOCIAL INSECURITY: HAVE YOU HAD THOUGHTS OF HARMING ANYONE ELSE?: NO

## 2024-02-26 SDOH — SOCIAL STABILITY: SOCIAL INSECURITY: DOES ANYONE TRY TO KEEP YOU FROM HAVING/CONTACTING OTHER FRIENDS OR DOING THINGS OUTSIDE YOUR HOME?: NO

## 2024-02-26 ASSESSMENT — COGNITIVE AND FUNCTIONAL STATUS - GENERAL
DAILY ACTIVITIY SCORE: 24
MOBILITY SCORE: 23
MOBILITY SCORE: 23
CLIMB 3 TO 5 STEPS WITH RAILING: A LITTLE
PATIENT BASELINE BEDBOUND: NO
DAILY ACTIVITIY SCORE: 24
CLIMB 3 TO 5 STEPS WITH RAILING: A LITTLE

## 2024-02-26 ASSESSMENT — ACTIVITIES OF DAILY LIVING (ADL)
ADEQUATE_TO_COMPLETE_ADL: YES
GROOMING: INDEPENDENT
BATHING: INDEPENDENT
FEEDING YOURSELF: INDEPENDENT
TOILETING: INDEPENDENT
LACK_OF_TRANSPORTATION: PATIENT DECLINED
JUDGMENT_ADEQUATE_SAFELY_COMPLETE_DAILY_ACTIVITIES: YES
WALKS IN HOME: INDEPENDENT
HEARING - LEFT EAR: FUNCTIONAL
HEARING - RIGHT EAR: FUNCTIONAL
PATIENT'S MEMORY ADEQUATE TO SAFELY COMPLETE DAILY ACTIVITIES?: YES
DRESSING YOURSELF: INDEPENDENT

## 2024-02-26 ASSESSMENT — PATIENT HEALTH QUESTIONNAIRE - PHQ9
SUM OF ALL RESPONSES TO PHQ9 QUESTIONS 1 & 2: 0
1. LITTLE INTEREST OR PLEASURE IN DOING THINGS: NOT AT ALL
2. FEELING DOWN, DEPRESSED OR HOPELESS: NOT AT ALL

## 2024-02-26 ASSESSMENT — PAIN SCALES - GENERAL
PAINLEVEL_OUTOF10: 0 - NO PAIN
PAINLEVEL_OUTOF10: 3

## 2024-02-26 ASSESSMENT — LIFESTYLE VARIABLES
AUDIT-C TOTAL SCORE: 0
HOW MANY STANDARD DRINKS CONTAINING ALCOHOL DO YOU HAVE ON A TYPICAL DAY: PATIENT DOES NOT DRINK
HOW OFTEN DO YOU HAVE A DRINK CONTAINING ALCOHOL: NEVER
SKIP TO QUESTIONS 9-10: 1
AUDIT-C TOTAL SCORE: 0
HOW OFTEN DO YOU HAVE 6 OR MORE DRINKS ON ONE OCCASION: NEVER

## 2024-02-26 ASSESSMENT — PAIN - FUNCTIONAL ASSESSMENT
PAIN_FUNCTIONAL_ASSESSMENT: 0-10
PAIN_FUNCTIONAL_ASSESSMENT: 0-10

## 2024-02-26 NOTE — CONSULTS
Inpatient consult to Cardiology  Consult performed by: IGNACIO Servin-CNP  Consult ordered by: Delisa Jeffries MD  Reason for consult: nstemi      Cardiology Consult Note      Date:   2/26/2024  Patient name:  Juana Frausto  Date of admission:  2/25/2024  4:55 PM  MRN:   [id number]  YOB: 1940  Time of Consult:  9:01 AM    Consulting Cardiologist: IGNACIO Maldonado, CNP  Primary Cardiologist:   [facility]  Dr Goodwin    Referring Provider: Dr Garza      History of Present Illness:      Juana Frausto is a 83 y.o.  female patient who is being at the request of Dr. Garza for inpatient consultation of angina. She was admitted on 2/25/2024.  Previous [facility] and [facility] records have been reviewed in detail.   Patient with history of CAD CABG x 1 vessel May 2023, aortic valve replacement, hypertension, dyslipidemia, family history of CAD.  Patient states that yesterday she was having family stressful event went to lay down over her left side of her chest she started having pain she called her 911 by the time the ambulance got there they gave her 5 baby aspirin give her 1 nitro pain went from a 9 down to a 0.  She states that she did feel little nauseated she did not quite feel like her self they did do a chest x-ray and serial troponins.  So she was positive for chest pain, diaphoresis, nausea.  She denied fever, chills, PND, orthopnea, claudications  Trop 18  EKG nsr with PAC's P depression in V5 and 6  Back in May 2023 in [place] she had a bioprosthetic aortic valve replacement and a graft to her LAD.  She recently had an echo done at the [facility] her EF is 50%.  She is currently on a heparin drip chest pain-free she feels pretty good at this present time.      Cardiac history  1/5/24   CONCLUSIONS:   - Exam indication: systolic and diastolic chf, Aortic stenosis   - The left ventricle is normal in size. There is mild asymmetric left ventricular    hypertrophy. Left ventricular systolic function is mildly decreased with mild LBBB    related dyssynchrony. EF = 52 ± 5% (2D 4-ch.) Grade II left ventricular diastolic    dysfunction.   - The right ventricle is normal in size. Right ventricular systolic function is   low normal.   - The left atrial cavity is severely dilated.   - The visualized aorta is dilated with a maximal dimension of 4.2 cm at the   mid-ascending aorta.   - There is moderate (2+ - 3+) tricuspid valve regurgitation.   - Bioprosthetic aortic valve. There is no aortic valve regurgitation. The peak   gradient is 23 mmHg, the mean gradient is 12 mmHg and the dimensionless valve   index is 0.47.   - Estimated right ventricular systolic pressure is 48 mmHg consistent with mild   pulmonary hypertension. Estimated right atrial pressure is 3 mmHg based on IVC   assessment.   - Exam was compared with the prior  echocardiographic exam performed on 1/12/16.    s/p Aortic valve replacement since prior.         Allergies:     Allergies   Allergen Reactions    Betadine [Povidone-Iodine] Rash    Penicillin G Rash         Past Medical History:   CAD  Hypertension  Dyslipidemia  No past medical history on file.    Past Surgical History:     CABG x 1 vessel LAD, AVR      Family History:     Non-contributory      Social History:     Social History     Tobacco Use    Smoking status: Never    Smokeless tobacco: Never       CURRENT INPATIENT MEDICATIONS    amLODIPine, 10 mg, oral, Nightly  aspirin, 81 mg, oral, Daily  busPIRone, 15 mg, oral, BID  DULoxetine, 20 mg, oral, Daily  ezetimibe, 10 mg, oral, Daily  hydrOXYzine HCL, 25 mg, oral, Nightly  isosorbide mononitrate ER, 30 mg, oral, Daily  losartan, 25 mg, oral, Daily  melatonin, 3 mg, oral, Daily  [START ON 2/27/2024] metoprolol succinate XL, 25 mg, oral, Daily  pantoprazole, 40 mg, oral, Daily   Or  pantoprazole, 40 mg, intravenous, Daily  polyethylene glycol, 17 g, oral, Daily  rosuvastatin, 10 mg, oral,  "Nightly      heparin, 0-4,000 Units/hr, Last Rate: 700 Units/hr (02/26/24 0516)      Current Outpatient Medications   Medication Instructions    amLODIPine (Norvasc) 2.5 mg tablet 1 tablet, oral, Nightly    aspirin 81 mg chewable tablet 1 tablet, oral, Daily RT    busPIRone (BUSPAR) 15 mg, oral, 2 times daily    DULoxetine (CYMBALTA) 20 mg, oral, Daily RT    ezetimibe (ZETIA) 10 mg, oral, Daily RT    hydrOXYzine HCL (Atarax) 25 mg tablet 1 tablet, oral, Nightly    LORazepam (ATIVAN) 1 mg, oral, 2 times daily PRN    losartan (COZAAR) 25 mg, oral, Daily RT    metoprolol succinate XL (TOPROL-XL) 50 mg, oral, Daily RT    rosuvastatin (CRESTOR) 10 mg, oral, Daily        Review of Systems:      12 point review of systems was obtained in detail and is negative other than that detailed above.      Vital Signs:     Vitals:    02/25/24 2245 02/25/24 2359 02/26/24 0007 02/26/24 0726   BP: (!) 144/93 143/76  124/65   BP Location:  Right arm     Patient Position:  Sitting     Pulse: 60 50  51   Resp: 18 18  14   Temp:  36.6 °C (97.9 °F)  36.9 °C (98.4 °F)   TempSrc:  Temporal     SpO2: 98% 96%  96%   Weight:   57.5 kg (126 lb 12.2 oz)    Height:   1.626 m (5' 4\")        Intake/Output Summary (Last 24 hours) at 2/26/2024 0901  Last data filed at 2/26/2024 0601  Gross per 24 hour   Intake 50 ml   Output --   Net 50 ml       Wt Readings from Last 4 Encounters:   02/26/24 57.5 kg (126 lb 12.2 oz)       Physical Examination:     GENERAL APPEARANCE: Well developed, well nourished, in no acute distress.  CHEST: Symmetric and non-tender.  INTEGUMENT: Skin warm and dry, without gross excoriationis or lesions.  HEENT: No gross abnormalities of conjunctiva, teeth, gums, oral mucosa  NECK: Supple, no JVD, no bruit. Thyroid not palpable. Carotid upstrokes normal.  NEURO/PSHCY: Alert and oriented x3; appropriate behavior and responses and responses, grossly normal cerebellar function with normal balance and coordination  LUNGS: Clear to " auscultation bilaterally; normal respiratory effort.  HEART: Rate and rhythm regular with no evident murmur; no gallop appreciated. There are no rubs, clicks or heaves. PMI nondisplaced.  ABDOMEN: Soft, nontender, no palpable hepatosplenomegaly, no mases, no bruits. Abdominal aorta not noted to be enlarged.  MUSCULOSKELETAL: Ambulatory with normal tandem gait.  EXTREMITIES: Warm with good color, no clubbing or cyanois. There is no edema noted.  PERIPHERAL VASCULAR: Pulses present and equally palpable; 2+ throughout. No femoral bruits.      Lab:     CBC:   Results from last 7 days   Lab Units 02/26/24  0426 02/25/24  1710   WBC AUTO x10*3/uL 5.2 7.7   RBC AUTO x10*6/uL 4.06 4.64   HEMOGLOBIN g/dL 11.7* 13.5   HEMATOCRIT % 35.5* 40.6   MCV fL 87 88   MCH pg 28.8 29.1   MCHC g/dL 33.0 33.3   RDW % 12.5 12.5   PLATELETS AUTO x10*3/uL 192 223     CMP:    Results from last 7 days   Lab Units 02/26/24  0426 02/25/24  1710   SODIUM mmol/L 136 136   POTASSIUM mmol/L 3.5 4.2   CHLORIDE mmol/L 105 103   CO2 mmol/L 24 22   BUN mg/dL 27* 27*   CREATININE mg/dL 1.20* 1.18*   GLUCOSE mg/dL 124* 118*   PROTEIN TOTAL g/dL 6.3* 7.5   CALCIUM mg/dL 9.3 10.5*   BILIRUBIN TOTAL mg/dL 0.4 0.6   ALK PHOS U/L 62 71   AST U/L 20 25   ALT U/L 19 25     BMP:    Results from last 7 days   Lab Units 02/26/24  0426 02/25/24  1710   SODIUM mmol/L 136 136   POTASSIUM mmol/L 3.5 4.2   CHLORIDE mmol/L 105 103   CO2 mmol/L 24 22   BUN mg/dL 27* 27*   CREATININE mg/dL 1.20* 1.18*   CALCIUM mg/dL 9.3 10.5*   GLUCOSE mg/dL 124* 118*     Magnesium:  Results from last 7 days   Lab Units 02/25/24  1710   MAGNESIUM mg/dL 1.79     Troponin:    Results from last 7 days   Lab Units 02/25/24  1937 02/25/24  1710   TROPHS ng/L 18* 16*     BNP:   Results from last 7 days   Lab Units 02/25/24  1710   BNP pg/mL 538*       Diagnostic Studies:     ECG 12 lead  Result Date: 2/25/2024    Sinus rhythm with Premature supraventricular complexes Left anterior fascicular  block Left ventricular hypertrophy with repolarization abnormality Cannot rule out Septal infarct , age undetermined Abnormal ECG When compared with ECG of 23-SEP-2008 08:48, Premature supraventricular complexes are now Present Minimal criteria for Septal infarct are now Present Nonspecific T wave abnormality no longer evident in Inferior leads See ED provider note for full interpretation and clinical correlation      Radiology:     XR chest 1 view   Final Result   No acute pulmonary abnormality.   Signed by Chilo Fink MD          Problem List:     Patient Active Problem List   Diagnosis    Chest pain    NSTEMI, initial episode of care (CMS/[facility])    Aortic valve stenosis    Cardiomyopathy (CMS/[facility])    Coronary artery disease involving native coronary artery of native heart without angina pectoris    Diaphragmatic hernia    Dysphagia    Diverticulosis    Echocardiogram shows left ventricular diastolic dysfunction    Essential hypertension, benign    GERD (gastroesophageal reflux disease)    Hyperlipidemia, mixed    Anxiety and depression    Obstructive sleep apnea (adult) (pediatric)    Primary osteoarthritis of both first carpometacarpal joints    Primary osteoarthritis of both knees    Pure hypercholesterolemia    Renal cyst    S/P arthroscopy of left knee    Stage 3b chronic kidney disease (CMS/[facility])    SVT (supraventricular tachycardia)    Tear film insufficiency    Vitamin D deficiency    Vitreous degeneration       Assessment:   Chest pain rule out ACS  History of CABG x 1 vessel LAD, AVR  Hypertension  Dyslipidemia  EF 50% 1/5/2024      Plan:   Tele monitoring  Serial enzymes  Echo done 1/5/24  Daily EKGs  Aspirin 81 daily  Decrease Toprol-XL to 25 daily  Add Imdur 30 daily  Cozaar 25 daily  Aldactone 12.5 daily  Crestor 10 mg daily  Zetia 10 mg daily    Further recommendations per Dr rosio Murphy [facility]    Of note, this documentation  is completed using the Dragon Dictation system (voice recognition software). There may be spelling and/or grammatical errors that were not corrected prior to final submission.    Electronically signed by KAHLIL Servin, on 2/26/2024 at 9:01 AM     I have personally interviewed and examined the patient.   I have personally and independently reviewed labs and diagnostic testing.  I have personally verified the elements of the history and physical listed above and changes, if any, are noted.   I have personally reviewed the assessment and plan as documented by Guy Murphy, ODETTE, CNP and concur.    In summary, Mr. Juana Frausto has a history of atherosclerotic heart disease with coronary artery bypass graft x 1 (Graft to LAD--Details not available) May 2023.  She also underwent bioprosthetic aortic valve replacement.  She has a history of hypertension and hyperlipidemia.  An echocardiogram January 5, 2024 showed estimated LV ejection fraction 50 to 55%.  Ascending aorta measured 4.2 cm.  There was moderate tricuspid regurgitation.  Normal functioning bioprosthetic aortic valve with peak gradient 23 mmHg.  Estimated RVSP was 48 mmHg.     She presented to the emergency department yesterday after a 2-minute episode of chest discomfort associated with some diaphoresis and nausea.  Patient states she was having an argument with her family.  She rated the discomfort 9 out of 10.  The discomfort resolved promptly after receiving sublingual nitroglycerin.  Initial blood pressure 144/93 mmHg.  Current vital signs are stable.  Cardiac rhythm is regular.  Lungs are clear.  No significant peripheral edema.  EKG shows normal sinus rhythm without acute ST changes.  Chest x-ray does not show any active disease.  CBC normal with exception of hemoglobin 11.7.  Comprehensive metabolic profile normal with exception of BUN 27 creatinine 1.18.  High-sensitivity troponin level 16 and 18.  BNP level 538.  She currently is  resting comfortably.  No further chest discomfort.  She does note some discomfort in the left scapular area.  The symptoms seem better when the muscles were palpated.  She suspects this is related to laying on a cart for prolonged period time in the ED.  We discussed various diagnostic and treatment options.  Her symptoms were suspicious for angina pectoris, although they were very short-lived and she has not had any recurrence.  Recent single-vessel coronary artery bypass grafting surgery.  She will be scheduled for a Lexiscan myocardial perfusion study this morning.  Further recommendations pending these results.

## 2024-02-26 NOTE — PROGRESS NOTES
Me tw/ pt and dtr earlier today. Confirmed demo's. No medication barriers. Pt is independent in adls and iadls. Pt request info on local senior living apts. I will speak w/ SW ezequiel to see if she has any info on this. Pt for GXT tomorrow.

## 2024-02-26 NOTE — H&P
History Of Present Illness  Juana Frausto is a 83 y.o. female presenting with chest pain for 1 day.  Patient reported 2 episodes of chest pain while she was laying down in bed.  The pain was retrosternal and left-sided, sharp initially and then became dull, lasted about 20 to 30 minutes each time.  She felt chest heaviness with it.  There is no radiation and no other symptoms reported.    Patient has history of CABG in May 2023, and history of CHF with ejection fraction 30% after the CABG.    ER course: Patient was awake, alert, oriented, in no acute distress but looked anxious.  She has elevated blood pressure up to 187/99.  Labs shows marginally elevated troponin 16 and on the repeat was 18, and has elevated proBNP 538 is likely from her baseline CKD.  There is no leukocytosis.  Her creatinine is 1.18 and she has history of CKD.  EKG was sinus rhythm no ischemic changes  Chest x-ray reported negative for acute cardiopulmonary pathology.  Patient was given loading dose of aspirin in the ER.  Admitted to medicine for further workup of chest pain.    Past Medical History  Coronary artery disease status post CABG  Hypertension  Chronic kidney disease  Anxiety and depression  Hyperlipidemia  Ischemic cardiomyopathy ejection fraction 30%  She reported valvular disease but not sure which valve    Surgical History  CABG     Social History  She has no history on file for tobacco use, alcohol use, and drug use.    Family History  Her father  of heart attack without older age.     Allergies  Betadine [povidone-iodine] and Penicillin g    Review of Systems  10/10 points review of system were conducted, negative except as above    Physical Exam  Constitutional:       Appearance: Normal appearance. She is not ill-appearing or diaphoretic.   HENT:      Head: Normocephalic and atraumatic.      Nose: Nose normal. No congestion or rhinorrhea.      Mouth/Throat:      Mouth: Mucous membranes are moist.   Eyes:      General: No  "scleral icterus.     Extraocular Movements: Extraocular movements intact.      Conjunctiva/sclera: Conjunctivae normal.      Pupils: Pupils are equal, round, and reactive to light.   Neck:      Vascular: No carotid bruit.   Cardiovascular:      Rate and Rhythm: Normal rate and regular rhythm.      Heart sounds: Murmur heard.      No friction rub.   Pulmonary:      Effort: No respiratory distress.      Breath sounds: No stridor. No wheezing, rhonchi or rales.   Chest:      Chest wall: No tenderness.   Abdominal:      General: There is no distension.      Palpations: Abdomen is soft. There is no mass.      Tenderness: There is no abdominal tenderness. There is no right CVA tenderness, left CVA tenderness, guarding or rebound.      Hernia: No hernia is present.   Musculoskeletal:         General: No swelling, tenderness, deformity or signs of injury.      Cervical back: Normal range of motion and neck supple. No rigidity or tenderness.      Right lower leg: No edema.      Left lower leg: No edema.   Lymphadenopathy:      Cervical: No cervical adenopathy.   Skin:     General: Skin is warm and dry.      Coloration: Skin is not jaundiced or pale.      Findings: No bruising, erythema, lesion or rash.   Neurological:      General: No focal deficit present.      Mental Status: She is alert and oriented to person, place, and time. Mental status is at baseline.   Psychiatric:         Mood and Affect: Mood normal.         Behavior: Behavior normal.      Comments: Looks anxious          Last Recorded Vitals  Blood pressure (!) 181/85, pulse 66, temperature 36.5 °C (97.7 °F), temperature source Temporal, resp. rate 18, height 1.626 m (5' 4\"), weight 59.4 kg (131 lb), SpO2 98 %.    Relevant Results    Scheduled medications  amLODIPine, 10 mg, oral, Nightly  [START ON 2/26/2024] aspirin, 81 mg, oral, Daily  busPIRone, 15 mg, oral, BID  [START ON 2/26/2024] ezetimibe, 10 mg, oral, Daily  finasteride, 5 mg, oral, Daily  heparin, 60 " Units/kg, intravenous, Once  hydrOXYzine HCL, 25 mg, oral, Nightly  [START ON 2/26/2024] losartan, 25 mg, oral, Daily  magnesium sulfate, 1 g, intravenous, Once  melatonin, 3 mg, oral, Daily  [START ON 2/26/2024] metoprolol succinate XL, 50 mg, oral, Daily  [START ON 2/26/2024] pantoprazole, 40 mg, oral, Daily before breakfast   Or  [START ON 2/26/2024] pantoprazole, 40 mg, intravenous, Daily before breakfast  polyethylene glycol, 17 g, oral, Daily  pravastatin, 10 mg, oral, Nightly  spironolactone, 50 mg, oral, BID  venlafaxine XR, 150 mg, oral, Daily      Continuous medications  heparin, 0-4,000 Units/hr      PRN medications  PRN medications: acetaminophen **OR** acetaminophen **OR** acetaminophen, heparin, hydrALAZINE, LORazepam, ondansetron **OR** ondansetron      Results for orders placed or performed during the hospital encounter of 02/25/24 (from the past 24 hour(s))   ECG 12 lead   Result Value Ref Range    Ventricular Rate 61 BPM    Atrial Rate 61 BPM    NM Interval 162 ms    QRS Duration 112 ms    QT Interval 470 ms    QTC Calculation(Bazett) 473 ms    P Axis 43 degrees    R Axis -71 degrees    T Axis 96 degrees    QRS Count 10 beats    Q Onset 208 ms    P Onset 127 ms    P Offset 180 ms    T Offset 443 ms    QTC Fredericia 472 ms   CBC and Auto Differential   Result Value Ref Range    WBC 7.7 4.4 - 11.3 x10*3/uL    nRBC 0.0 0.0 - 0.0 /100 WBCs    RBC 4.64 4.00 - 5.20 x10*6/uL    Hemoglobin 13.5 12.0 - 16.0 g/dL    Hematocrit 40.6 36.0 - 46.0 %    MCV 88 80 - 100 fL    MCH 29.1 26.0 - 34.0 pg    MCHC 33.3 32.0 - 36.0 g/dL    RDW 12.5 11.5 - 14.5 %    Platelets 223 150 - 450 x10*3/uL    Neutrophils % 76.8 40.0 - 80.0 %    Immature Granulocytes %, Automated 0.1 0.0 - 0.9 %    Lymphocytes % 15.4 13.0 - 44.0 %    Monocytes % 6.0 2.0 - 10.0 %    Eosinophils % 1.4 0.0 - 6.0 %    Basophils % 0.3 0.0 - 2.0 %    Neutrophils Absolute 5.87 (H) 1.60 - 5.50 x10*3/uL    Immature Granulocytes Absolute, Automated 0.01  0.00 - 0.50 x10*3/uL    Lymphocytes Absolute 1.18 0.80 - 3.00 x10*3/uL    Monocytes Absolute 0.46 0.05 - 0.80 x10*3/uL    Eosinophils Absolute 0.11 0.00 - 0.40 x10*3/uL    Basophils Absolute 0.02 0.00 - 0.10 x10*3/uL   Comprehensive Metabolic Panel   Result Value Ref Range    Glucose 118 (H) 74 - 99 mg/dL    Sodium 136 136 - 145 mmol/L    Potassium 4.2 3.5 - 5.3 mmol/L    Chloride 103 98 - 107 mmol/L    Bicarbonate 22 21 - 32 mmol/L    Anion Gap 15 10 - 20 mmol/L    Urea Nitrogen 27 (H) 6 - 23 mg/dL    Creatinine 1.18 (H) 0.50 - 1.05 mg/dL    eGFR 45 (L) >60 mL/min/1.73m*2    Calcium 10.5 (H) 8.6 - 10.3 mg/dL    Albumin 4.4 3.4 - 5.0 g/dL    Alkaline Phosphatase 71 33 - 136 U/L    Total Protein 7.5 6.4 - 8.2 g/dL    AST 25 9 - 39 U/L    Bilirubin, Total 0.6 0.0 - 1.2 mg/dL    ALT 25 7 - 45 U/L   Magnesium   Result Value Ref Range    Magnesium 1.79 1.60 - 2.40 mg/dL   Protime-INR   Result Value Ref Range    Protime 12.4 9.8 - 12.8 seconds    INR 1.1 0.9 - 1.1   Lipase   Result Value Ref Range    Lipase 26 9 - 82 U/L   B-Type Natriuretic Peptide   Result Value Ref Range     (H) 0 - 99 pg/mL   Troponin I, High Sensitivity, Initial   Result Value Ref Range    Troponin I, High Sensitivity 16 (H) 0 - 13 ng/L   Sars-CoV-2 and Influenza A/B PCR   Result Value Ref Range    Flu A Result Not Detected Not Detected    Flu B Result Not Detected Not Detected    Coronavirus 2019, PCR Not Detected Not Detected   Troponin, High Sensitivity, 1 Hour   Result Value Ref Range    Troponin I, High Sensitivity 18 (H) 0 - 13 ng/L         XR chest 1 view    Result Date: 2/25/2024  STUDY: Chest Radiograph;  2/25/2024 at 18:10 hours. INDICATION: Chest pain. COMPARISON: None Available ACCESSION NUMBER(S): [id number] ORDERING CLINICIAN: ANA MCNAMARA TECHNIQUE:  Frontal chest was obtained at 18:10 hours. FINDINGS: CARDIOMEDIASTINAL SILHOUETTE: Cardiomediastinal silhouette is normal in size and configuration.  LUNGS: Lungs are clear.   ABDOMEN: No remarkable upper abdominal findings.  BONES: No acute osseous changes.    No acute pulmonary abnormality. Signed by Chilo Fink MD    ECG 12 lead    Result Date: 2/25/2024  Sinus rhythm with Premature supraventricular complexes Left anterior fascicular block Left ventricular hypertrophy with repolarization abnormality Cannot rule out Septal infarct , age undetermined Abnormal ECG When compared with ECG of 23-SEP-2008 08:48, Premature supraventricular complexes are now Present Minimal criteria for Septal infarct are now Present Nonspecific T wave abnormality no longer evident in Inferior leads See ED provider note for full interpretation and clinical correlation    US renal complete    Result Date: 2/19/2024  * * *Final Report* * * DATE OF EXAM: Feb 16 2024  3:25PM   LNU   1055  -  US KIDNEY/BLADDER  / ACCESSION #  [id number] PROCEDURE REASON: Renal cyst      * * * * Physician Interpretation * * * *  EXAMINATION:   RENAL ULTRASOUND CLINICAL HISTORY: Renal cyst TECHNIQUE:  Sonography of the kidneys and urinary bladder was performed.   Images were obtained and stored in a permanent archive. MQ:  UR_1 COMPARISON: MRI abdomen dated 5/12/16 RESULT: Right Kidney:      -Renal length: 9.9 cm      -Parenchyma: Normal parenchymal echogenicity.  Normal parenchymal thickness.      -Collecting system: No hydronephrosis.      -Calculus: No echogenic, shadowing calculus.      -Lesion:  9 mm lower pole cyst. Left Kidney:      -Renal length: 8.8 cm      -Parenchyma: Normal parenchymal echogenicity.  Normal parenchymal thickness.      -Collecting system: No hydronephrosis.      -Calculus: Nonspecific 2 mm nonshadowing echogenic focus.      -Lesion:  2.6 cm lower pole cyst. Bladder: Decompressed.  Not clearly visualized.    IMPRESSION: 1.  No evidence of hydronephrosis. 2.  Bilateral renal cysts. 3.  Nonspecific 2 mm nonshadowing echogenic focus in the left kidney may represent artifact, vascular calcification or subtle  renal stone. : GABRIEL   Transcribe Date/Time: Feb 19 2024 10:19A Dictated by : PHONG HELMS MD This examination was interpreted and the report reviewed and electronically signed by: PHONG HELMS MD on Feb 19 2024 10:47AM  EST     Assessment/Plan   Principal Problem:    NSTEMI, initial episode of care (CMS/[facility])  Active Problems:    Chest pain    Aortic valve stenosis    Cardiomyopathy (CMS/[facility])    Essential hypertension, benign    GERD (gastroesophageal reflux disease)    Anxiety and depression    -Start patient on heparin drip.  -Monitor serum electrolytes and correct as indicated.  Will go 1 g of magnesium tonight.  -Obtain echocardiogram.  -Trend another troponin.  -Cardiology consult.  -Blood pressure control.  Will add hydralazine to her regimen for better control.  -PPI for GI prophylaxis.  -She is clinically not fluid overloaded no need to give Lasix.  -COVID test, flu test both negative.  -Resume home medication for anxiety and depression.  Patient looks very anxious.  -Continuous telemonitoring.  -Keep n.p.o. after midnight for possible cardiac intervention in the morning.    Delisa Jeffries MD

## 2024-02-26 NOTE — PROGRESS NOTES
"PROGRESS NOTE    ASSESSMENT AND PLAN:   Non-ST elevation MI  History of coronary artery disease status post CABG  Ischemic cardiomyopathy  -History of ischemic cardiomyopathy with EF of 30%.  -Seen by cardiology, recommended nuclear stress test.  -Continue aspirin, continue losartan, metoprolol ER.    Aortic valve stenosis  -Outpatient follow-up    5.  CKD stage III  -Creatinine at baseline.    6.  Hyperglycemia  -Obtain hemoglobin A1c level    SUBJECTIVE:   Admit Date: 2/25/2024    Interval History: Complains of shoulder pain, otherwise has no active complaints      OBJECTIVE:   Vitals: /65   Pulse 51   Temp 36.9 °C (98.4 °F)   Resp 14   Ht 1.626 m (5' 4\")   Wt 57.5 kg (126 lb 12.2 oz)   SpO2 96%   BMI 21.76 kg/m²    Wt Readings from Last 3 Encounters:   02/26/24 57.5 kg (126 lb 12.2 oz)      24HR INTAKE/OUTPUT:    Intake/Output Summary (Last 24 hours) at 2/26/2024 1525  Last data filed at 2/26/2024 0601  Gross per 24 hour   Intake 50 ml   Output --   Net 50 ml       PHYSICAL EXAM:   GENERAL: Laying in bed, does not appear to be in any distress.   HEENT: HEAD: Normocephalic atraumatic.  Neck: Supple.  Eyes: Pupils are reactive to direct light.   CVS: S1, S2 heard. Regular rate and rhythm  LUNGS: Clear to auscultate bilaterally. No wheezing or rhonchi appreciated.  ABDOMEN: Soft, nontender to palpate. Positive bowel sounds. No guarding or rebound appreciated.  NEUROLOGICAL: No focal neurological deficits appreciated. Cranial nerves are grossly intact.  EXTREMITIES: No edema appreciated.  SKIN:  Grossly intact, warm and dry.      LABS/IMAGING AND MEDICATIONS:   Scheduled Meds:amLODIPine, 10 mg, oral, Nightly  aspirin, 81 mg, oral, Daily  aspirin, 81 mg, oral, Once  busPIRone, 15 mg, oral, BID  DULoxetine, 20 mg, oral, Daily  DULoxetine, 20 mg, oral, Once  ezetimibe, 10 mg, oral, Daily  ezetimibe, 10 mg, oral, Once  hydrOXYzine HCL, 25 mg, oral, Nightly  isosorbide mononitrate ER, 30 mg, oral, " "Daily  losartan, 25 mg, oral, Daily  losartan, 25 mg, oral, Once  melatonin, 3 mg, oral, Daily  [START ON 2/27/2024] metoprolol succinate XL, 25 mg, oral, Daily  metoprolol succinate XL, 25 mg, oral, Once  metoprolol succinate XL, 25 mg, oral, Once  pantoprazole, 40 mg, oral, Daily   Or  pantoprazole, 40 mg, intravenous, Daily  polyethylene glycol, 17 g, oral, Daily  rosuvastatin, 10 mg, oral, Nightly  spironolactone, 12.5 mg, oral, q24h FLAKITO  spironolactone, 12.5 mg, oral, Once      PRN Meds:PRN medications: acetaminophen **OR** acetaminophen **OR** acetaminophen, hydrALAZINE, LORazepam, ondansetron **OR** ondansetron    No lab exists for component: \"CBC\"   No lab exists for component: \"CMP\"   No lab exists for component: \"TROPONIN\"  Results from last 7 days   Lab Units 02/25/24  1710   BNP pg/mL 538*     Results from last 7 days   Lab Units 02/25/24  1710   INR  1.1     No lab exists for component: \"LIPIDS\"       No lab exists for component: \"URINALYSIS\"          BMP:  Results from last 7 days   Lab Units 02/26/24  0426 02/25/24  1710   SODIUM mmol/L 136 136   POTASSIUM mmol/L 3.5 4.2   CHLORIDE mmol/L 105 103   CO2 mmol/L 24 22   BUN mg/dL 27* 27*   CREATININE mg/dL 1.20* 1.18*       CBC:  Results from last 7 days   Lab Units 02/26/24  0426 02/25/24  1710   WBC AUTO x10*3/uL 5.2 7.7   RBC AUTO x10*6/uL 4.06 4.64   HEMOGLOBIN g/dL 11.7* 13.5   HEMATOCRIT % 35.5* 40.6   MCV fL 87 88   MCH pg 28.8 29.1   MCHC g/dL 33.0 33.3   RDW % 12.5 12.5   PLATELETS AUTO x10*3/uL 192 223       Cardiac Enzymes:   Results from last 7 days   Lab Units 02/26/24  0426 02/25/24  1937 02/25/24  1710   TROPHS ng/L 17* 18* 16*         Hepatic Function Panel:  Results from last 7 days   Lab Units 02/26/24  0426 02/25/24  1710   ALK PHOS U/L 62 71   ALT U/L 19 25   AST U/L 20 25   PROTEIN TOTAL g/dL 6.3* 7.5   BILIRUBIN TOTAL mg/dL 0.4 0.6       Magnesium:  Results from last 7 days   Lab Units 02/25/24  1710   MAGNESIUM mg/dL 1.79 " "      Pro-BNP:  No results found for: \"PROBNP\"    INR:  Results from last 7 days   Lab Units 02/25/24  1710   PROTIME seconds 12.4   INR  1.1       TSH:  No results found for: \"TSH\"    Lipid Profile:        No lab exists for component: \"LABVLDL\"    HgbA1C:        Lactate Level:  No lab exists for component: \"LACTA\"    CMP:  Results from last 7 days   Lab Units 02/26/24  0426 02/25/24  1710   SODIUM mmol/L 136 136   POTASSIUM mmol/L 3.5 4.2   CHLORIDE mmol/L 105 103   CO2 mmol/L 24 22   BUN mg/dL 27* 27*   CREATININE mg/dL 1.20* 1.18*   GLUCOSE mg/dL 124* 118*   CALCIUM mg/dL 9.3 10.5*   PROTEIN TOTAL g/dL 6.3* 7.5   BILIRUBIN TOTAL mg/dL 0.4 0.6   ALK PHOS U/L 62 71   AST U/L 20 25   ALT U/L 19 25       Amylase:        Lipase:  Results from last 7 days   Lab Units 02/25/24  1710   LIPASE U/L 26       ABG:        No lab exists for component: \"PO2\", \"PCO2\", \"HCO3\", \"BE\", \"O2SAT\"       "

## 2024-02-26 NOTE — ED PROVIDER NOTES
HPI   Chief Complaint   Patient presents with   • Chest Pain     Pt had CP for about 30 min and was brought in via squad. Hx open heart in May 2023 & EF is 30%.       Patient is an 83-year-old female presenting to the emergency department complaints of chest pain.  Patient states that she had some sharp chest pain this morning while she was doing nothing specific which lasted for seconds and then self resolved.  Patient states that she had a different recurrence of sharp chest pain on the left side that lasted longer so she came to the emergency department for evaluation.  Patient does not currently have any chest pain or discomfort while in the ED.  Patient does have a history of CAD with previous CABG.  Patient states she was in her normal state health when she went to bed.  Patient denies any fevers, chills, difficulty breathing, productive cough, abdominal pain, back pain, lower extremity swelling, syncopal episodes, falls or traumatic injuries.      History provided by:  Patient   used: No                        Nita Coma Scale Score: 15   HEART Score: 5                   Patient History   No past medical history on file.  No past surgical history on file.  No family history on file.  Social History     Tobacco Use   • Smoking status: Not on file   • Smokeless tobacco: Not on file   Substance Use Topics   • Alcohol use: Not on file   • Drug use: Not on file       Physical Exam   ED Triage Vitals   Temperature Heart Rate Respirations BP   02/25/24 1717 02/25/24 1717 02/25/24 1717 02/25/24 1717   36.5 °C (97.7 °F) 61 18 150/86      Pulse Ox Temp Source Heart Rate Source Patient Position   02/25/24 1717 02/25/24 1717 02/25/24 1717 02/25/24 1943   95 % Temporal Monitor Lying      BP Location FiO2 (%)     02/25/24 1943 --     Right arm        Physical Exam  Vitals and nursing note reviewed.   Constitutional:       General: She is not in acute distress.     Appearance: Normal appearance. She is  not ill-appearing, toxic-appearing or diaphoretic.   HENT:      Head: Normocephalic and atraumatic.      Nose: Nose normal.      Mouth/Throat:      Mouth: Mucous membranes are moist.      Pharynx: No oropharyngeal exudate or posterior oropharyngeal erythema.   Eyes:      General: No scleral icterus.     Extraocular Movements: Extraocular movements intact.      Pupils: Pupils are equal, round, and reactive to light.   Cardiovascular:      Rate and Rhythm: Normal rate and regular rhythm.      Pulses: Normal pulses.      Heart sounds: Normal heart sounds. No murmur heard.     No friction rub. No gallop.   Pulmonary:      Effort: Pulmonary effort is normal. No respiratory distress.      Breath sounds: Normal breath sounds. No stridor. No wheezing, rhonchi or rales.   Chest:      Chest wall: No tenderness.   Abdominal:      General: Abdomen is flat. There is no distension.      Palpations: Abdomen is soft. There is no mass.      Tenderness: There is no abdominal tenderness. There is no guarding.      Hernia: No hernia is present.   Musculoskeletal:         General: No swelling, tenderness, deformity or signs of injury. Normal range of motion.      Cervical back: Normal range of motion and neck supple. No rigidity.   Skin:     General: Skin is warm and dry.      Capillary Refill: Capillary refill takes less than 2 seconds.      Coloration: Skin is not jaundiced or pale.      Findings: No bruising, erythema, lesion or rash.   Neurological:      General: No focal deficit present.      Mental Status: She is alert and oriented to person, place, and time. Mental status is at baseline.   Psychiatric:         Mood and Affect: Mood normal.         Behavior: Behavior normal.         ED Course & MDM   Diagnoses as of 02/25/24 2039   Chest pain, unspecified type   Elevated troponin       Medical Decision Making  Patient is an 83-year-old female presenting to the emergency department for complaints of chest pain.  Patient is  pain-free on my initial evaluation.  EKG without any ischemic changes.  Chest x-ray showed no acute cardiopulmonary processes.  CBC reviewed without concerning findings.  Metabolic panel with elevated BUN and creatinine without any previous for baseline comparison.  BNP was elevated at 538.  Patient does not appear volume overloaded on exam.  Initial troponin was elevated at 16.  Patient's blood pressure did become elevated however she is due for her antihypertensive medications this evening.  These were ordered for the patient.  Repeat troponin returned at 18.  COVID and influenza were negative.  Lipase level is normal.  Magnesium levels normal.  Patient's heart score 5 elevated troponins with her previous chest pain recommended admission.  Hospital service was contacted and the case discussed and patient was accepted for admission.  Patient and daughter at bedside were advised of all laboratory and imaging findings and current care plan and they are in agreement with this.  All questions were answered.  Patient was admitted without incident.    Amount and/or Complexity of Data Reviewed  Labs: ordered. Decision-making details documented in ED Course.  Radiology: ordered. Decision-making details documented in ED Course.  ECG/medicine tests: independent interpretation performed.     Details: 1701 hrs.: Sinus rhythm with a ventricular rate of 61 bpm.  QRS interval 112 ms.  QTc 473 ms.  No acute ischemic injury pattern appreciated.      Labs Reviewed   CBC WITH AUTO DIFFERENTIAL - Abnormal       Result Value    WBC 7.7      nRBC 0.0      RBC 4.64      Hemoglobin 13.5      Hematocrit 40.6      MCV 88      MCH 29.1      MCHC 33.3      RDW 12.5      Platelets 223      Neutrophils % 76.8      Immature Granulocytes %, Automated 0.1      Lymphocytes % 15.4      Monocytes % 6.0      Eosinophils % 1.4      Basophils % 0.3      Neutrophils Absolute 5.87 (*)     Immature Granulocytes Absolute, Automated 0.01      Lymphocytes  Absolute 1.18      Monocytes Absolute 0.46      Eosinophils Absolute 0.11      Basophils Absolute 0.02     COMPREHENSIVE METABOLIC PANEL - Abnormal    Glucose 118 (*)     Sodium 136      Potassium 4.2      Chloride 103      Bicarbonate 22      Anion Gap 15      Urea Nitrogen 27 (*)     Creatinine 1.18 (*)     eGFR 45 (*)     Calcium 10.5 (*)     Albumin 4.4      Alkaline Phosphatase 71      Total Protein 7.5      AST 25      Bilirubin, Total 0.6      ALT 25     B-TYPE NATRIURETIC PEPTIDE - Abnormal     (*)     Narrative:        <100 pg/mL - Heart failure unlikely  100-299 pg/mL - Intermediate probability of acute heart                  failure exacerbation. Correlate with clinical                  context and patient history.    >=300 pg/mL - Heart Failure likely. Correlate with clinical                  context and patient history.    BNP testing is performed using different testing methodology at [facility] than at other [facility]. Direct result comparisons should only be made within the same method.      SERIAL TROPONIN-INITIAL - Abnormal    Troponin I, High Sensitivity 16 (*)     Narrative:     Less than 99th percentile of normal range cutoff-  Female and children under 18 years old <14 ng/L; Male <21 ng/L: Negative  Repeat testing should be performed if clinically indicated.     Female and children under 18 years old 14-50 ng/L; Male 21-50 ng/L:  Consistent with possible cardiac damage and possible increased clinical   risk. Serial measurements may help to assess extent of myocardial damage.     >50 ng/L: Consistent with cardiac damage, increased clinical risk and  myocardial infarction. Serial measurements may help assess extent of   myocardial damage.      NOTE: Children less than 1 year old may have higher baseline troponin   levels and results should be interpreted in conjunction with the overall   clinical context.     NOTE: Troponin I testing is performed using a different    testing methodology at [facility] than at other   [facility]. Direct result comparisons should only   be made within the same method.   SERIAL TROPONIN, 1 HOUR - Abnormal    Troponin I, High Sensitivity 18 (*)     Narrative:     Less than 99th percentile of normal range cutoff-  Female and children under 18 years old <14 ng/L; Male <21 ng/L: Negative  Repeat testing should be performed if clinically indicated.     Female and children under 18 years old 14-50 ng/L; Male 21-50 ng/L:  Consistent with possible cardiac damage and possible increased clinical   risk. Serial measurements may help to assess extent of myocardial damage.     >50 ng/L: Consistent with cardiac damage, increased clinical risk and  myocardial infarction. Serial measurements may help assess extent of   myocardial damage.      NOTE: Children less than 1 year old may have higher baseline troponin   levels and results should be interpreted in conjunction with the overall   clinical context.     NOTE: Troponin I testing is performed using a different   testing methodology at [facility] than at [facility]. Direct result comparisons should only   be made within the same method.   MAGNESIUM - Normal    Magnesium 1.79     PROTIME-INR - Normal    Protime 12.4      INR 1.1     LIPASE - Normal    Lipase 26      Narrative:     Venipuncture immediately after or during the administration of Metamizole may lead to falsely low results. Testing should be performed immediately prior to Metamizole dosing.   SARS-COV-2 AND INFLUENZA A/B PCR - Normal    Flu A Result Not Detected      Flu B Result Not Detected      Coronavirus 2019, PCR Not Detected      Narrative:     This assay has received FDA Emergency Use Authorization (EUA) and  is only authorized for the duration of time that circumstances exist to justify the authorization of the emergency use of in vitro diagnostic tests for the detection of SARS-CoV-2 virus  and/or diagnosis of COVID-19 infection under section 564(b)(1) of the Act, 21 U.S.C. 360bbb-3(b)(1). Testing for SARS-CoV-2 is only recommended for patients who meet current clinical and/or epidemiological criteria as defined by federal, state, or local public health directives. This assay is an in vitro diagnostic nucleic acid amplification test for the qualitative detection of SARS-CoV-2, Influenza A, and Influenza B from nasopharyngeal specimens and has been validated for use at [facility]. Negative results do not preclude COVID-19 infections or Influenza A/B infections, and should not be used as the sole basis for diagnosis, treatment, or other management decisions. If Influenza A/B and RSV PCR results are negative, testing for Parainfluenza virus, Adenovirus and Metapneumovirus is routinely performed for [facility] pediatric oncology and intensive care inpatients, and is available on other patients by placing an add-on request.    TROPONIN SERIES- (INITIAL, 1 HR)    Narrative:     The following orders were created for panel order Troponin I Series, High Sensitivity (0, 1 HR).  Procedure                               Abnormality         Status                     ---------                               -----------         ------                     Troponin I, High Sensiti...[[id number]]  Abnormal            Final result               Troponin, High Sensitivi...[[id number]]  Abnormal            Final result                 Please view results for these tests on the individual orders.     XR chest 1 view   Final Result   No acute pulmonary abnormality.   Signed by Chilo Fink MD            Procedure  Procedures     Josesito Argueta DO  02/25/24 2039

## 2024-02-27 ENCOUNTER — APPOINTMENT (OUTPATIENT)
Dept: CARDIOLOGY | Facility: HOSPITAL | Age: 84
End: 2024-02-27
Payer: MEDICARE

## 2024-02-27 ENCOUNTER — APPOINTMENT (OUTPATIENT)
Dept: RADIOLOGY | Facility: HOSPITAL | Age: 84
End: 2024-02-27
Payer: MEDICARE

## 2024-02-27 VITALS
TEMPERATURE: 97.9 F | BODY MASS INDEX: 21.64 KG/M2 | RESPIRATION RATE: 14 BRPM | SYSTOLIC BLOOD PRESSURE: 110 MMHG | WEIGHT: 126.76 LBS | DIASTOLIC BLOOD PRESSURE: 60 MMHG | HEIGHT: 64 IN | HEART RATE: 52 BPM | OXYGEN SATURATION: 97 %

## 2024-02-27 LAB
ATRIAL RATE: 52 BPM
ATRIAL RATE: 57 BPM
P AXIS: 36 DEGREES
P AXIS: 46 DEGREES
P OFFSET: 174 MS
P OFFSET: 176 MS
P ONSET: 116 MS
P ONSET: 121 MS
PR INTERVAL: 178 MS
PR INTERVAL: 186 MS
Q ONSET: 209 MS
Q ONSET: 210 MS
QRS COUNT: 9 BEATS
QRS COUNT: 9 BEATS
QRS DURATION: 110 MS
QRS DURATION: 112 MS
QT INTERVAL: 472 MS
QT INTERVAL: 490 MS
QTC CALCULATION(BAZETT): 455 MS
QTC CALCULATION(BAZETT): 459 MS
QTC FREDERICIA: 464 MS
QTC FREDERICIA: 467 MS
R AXIS: -71 DEGREES
R AXIS: -73 DEGREES
T AXIS: 87 DEGREES
T AXIS: 98 DEGREES
T OFFSET: 446 MS
T OFFSET: 454 MS
VENTRICULAR RATE: 52 BPM
VENTRICULAR RATE: 57 BPM

## 2024-02-27 PROCEDURE — A9502 TC99M TETROFOSMIN: HCPCS | Performed by: NURSE PRACTITIONER

## 2024-02-27 PROCEDURE — 78452 HT MUSCLE IMAGE SPECT MULT: CPT | Performed by: INTERNAL MEDICINE

## 2024-02-27 PROCEDURE — 99231 SBSQ HOSP IP/OBS SF/LOW 25: CPT | Performed by: INTERNAL MEDICINE

## 2024-02-27 PROCEDURE — 2500000001 HC RX 250 WO HCPCS SELF ADMINISTERED DRUGS (ALT 637 FOR MEDICARE OP): Performed by: HOSPITALIST

## 2024-02-27 PROCEDURE — G0378 HOSPITAL OBSERVATION PER HR: HCPCS

## 2024-02-27 PROCEDURE — 78452 HT MUSCLE IMAGE SPECT MULT: CPT

## 2024-02-27 PROCEDURE — 93017 CV STRESS TEST TRACING ONLY: CPT

## 2024-02-27 PROCEDURE — 2500000002 HC RX 250 W HCPCS SELF ADMINISTERED DRUGS (ALT 637 FOR MEDICARE OP, ALT 636 FOR OP/ED): Performed by: INTERNAL MEDICINE

## 2024-02-27 PROCEDURE — 2500000004 HC RX 250 GENERAL PHARMACY W/ HCPCS (ALT 636 FOR OP/ED): Performed by: HOSPITALIST

## 2024-02-27 PROCEDURE — 3430000001 HC RX 343 DIAGNOSTIC RADIOPHARMACEUTICALS: Performed by: NURSE PRACTITIONER

## 2024-02-27 PROCEDURE — 2500000004 HC RX 250 GENERAL PHARMACY W/ HCPCS (ALT 636 FOR OP/ED): Performed by: NURSE PRACTITIONER

## 2024-02-27 PROCEDURE — 96361 HYDRATE IV INFUSION ADD-ON: CPT

## 2024-02-27 PROCEDURE — 99239 HOSP IP/OBS DSCHRG MGMT >30: CPT | Performed by: HOSPITALIST

## 2024-02-27 PROCEDURE — 93005 ELECTROCARDIOGRAM TRACING: CPT

## 2024-02-27 PROCEDURE — 93010 ELECTROCARDIOGRAM REPORT: CPT | Performed by: INTERNAL MEDICINE

## 2024-02-27 RX ORDER — HYDROXYZINE HYDROCHLORIDE 10 MG/1
10 TABLET, FILM COATED ORAL NIGHTLY
Status: DISCONTINUED | OUTPATIENT
Start: 2024-02-27 | End: 2024-02-27

## 2024-02-27 RX ORDER — ALUMINUM HYDROXIDE, MAGNESIUM HYDROXIDE, AND SIMETHICONE 1200; 120; 1200 MG/30ML; MG/30ML; MG/30ML
10 SUSPENSION ORAL ONCE
Status: COMPLETED | OUTPATIENT
Start: 2024-02-27 | End: 2024-02-27

## 2024-02-27 RX ORDER — ISOSORBIDE MONONITRATE 30 MG/1
30 TABLET, EXTENDED RELEASE ORAL DAILY
Qty: 30 TABLET | Refills: 11 | Status: SHIPPED | OUTPATIENT
Start: 2024-02-27 | End: 2025-02-26

## 2024-02-27 RX ORDER — HYDROXYZINE HYDROCHLORIDE 10 MG/1
10 TABLET, FILM COATED ORAL ONCE
Status: COMPLETED | OUTPATIENT
Start: 2024-02-27 | End: 2024-02-27

## 2024-02-27 RX ORDER — REGADENOSON 0.08 MG/ML
0.4 INJECTION, SOLUTION INTRAVENOUS
Status: COMPLETED | OUTPATIENT
Start: 2024-02-27 | End: 2024-02-27

## 2024-02-27 RX ORDER — DULOXETIN HYDROCHLORIDE 30 MG/1
30 CAPSULE, DELAYED RELEASE ORAL DAILY
Qty: 30 CAPSULE | Refills: 0 | Status: SHIPPED | OUTPATIENT
Start: 2024-02-27 | End: 2024-03-28

## 2024-02-27 RX ORDER — METOPROLOL SUCCINATE 25 MG/1
25 TABLET, EXTENDED RELEASE ORAL DAILY
Qty: 30 TABLET | Refills: 0 | Status: SHIPPED | OUTPATIENT
Start: 2024-02-27 | End: 2024-03-28

## 2024-02-27 RX ADMIN — SODIUM CHLORIDE 250 ML: 9 INJECTION, SOLUTION INTRAVENOUS at 11:35

## 2024-02-27 RX ADMIN — TETROFOSMIN 8.1 MILLICURIE: 0.23 INJECTION, POWDER, LYOPHILIZED, FOR SOLUTION INTRAVENOUS at 08:40

## 2024-02-27 RX ADMIN — REGADENOSON 0.4 MG: 0.08 INJECTION, SOLUTION INTRAVENOUS at 09:38

## 2024-02-27 RX ADMIN — TETROFOSMIN 30.2 MILLICURIE: 0.23 INJECTION, POWDER, LYOPHILIZED, FOR SOLUTION INTRAVENOUS at 09:38

## 2024-02-27 RX ADMIN — PANTOPRAZOLE SODIUM 40 MG: 40 TABLET, DELAYED RELEASE ORAL at 06:26

## 2024-02-27 RX ADMIN — ALUMINUM HYDROXIDE, MAGNESIUM HYDROXIDE, AND SIMETHICONE 10 ML: 200; 200; 20 SUSPENSION ORAL at 14:27

## 2024-02-27 RX ADMIN — HYDROXYZINE HYDROCHLORIDE 10 MG: 10 TABLET ORAL at 14:00

## 2024-02-27 SDOH — SOCIAL STABILITY: SOCIAL NETWORK: COMMUNITY RESOURCES: HOUSING

## 2024-02-27 ASSESSMENT — COGNITIVE AND FUNCTIONAL STATUS - GENERAL
DAILY ACTIVITIY SCORE: 24
MOBILITY SCORE: 23
CLIMB 3 TO 5 STEPS WITH RAILING: A LITTLE

## 2024-02-27 ASSESSMENT — PAIN SCALES - GENERAL: PAINLEVEL_OUTOF10: 0 - NO PAIN

## 2024-02-27 ASSESSMENT — ENCOUNTER SYMPTOMS
LOSS OF SENSATION IN FEET: 0
DEPRESSION: 1
OCCASIONAL FEELINGS OF UNSTEADINESS: 0

## 2024-02-27 ASSESSMENT — PAIN - FUNCTIONAL ASSESSMENT: PAIN_FUNCTIONAL_ASSESSMENT: 0-10

## 2024-02-27 NOTE — DISCHARGE INSTRUCTIONS
It was nice caring for you during your stay at [facility]. As we discussed,  -Your medications have been changed, your metoprolol was reduced to 25 mg once a day  -Your duloxetine has been increased to 30 mg once a day  -Please continue to take your medications, please check your blood pressure daily and keep a log.  Please follow-up with cardiology and your primary care doctor    Wishing you a healthy recovery!

## 2024-02-27 NOTE — DISCHARGE SUMMARY
DISCHARGE DIAGNOSIS     Non-ST elevation MI  History of coronary artery disease status post CABG  Ischemic cardiomyopathy  Aortic valve stenosis status post aortic valve replacement  CKD stage III  Hyperglycemia  Depression    HOSPITAL COURSE AND DETAILS     Ms. Frausto is an 83-year-old with past medical history of coronary artery disease status post CABG, vessel, in May 2023, history of ischemic cardiomyopathy who presented to the emergency room with chest pain.    She has a history of one-vessel bypass in May 2023, she prior to admission started feeling anxiety and stress and had an episode of chest pain which prompted her to come to the emergency room.  Her troponins remained flat.  She was started on nitroglycerin and heparin drip for non-ST elevation MI.    She was seen by cardiology, recommended a nuclear stress test which was negative.  Her medications were adjusted, her metoprolol was reduced to 25 mg once a day, she was started on Imdur 30 mg once a day.    Her daughter also states that she suffers from anxiety and depression, she is currently on duloxetine and BuSpar, her duloxetine was increased to 30 mg once a day.    She will be referred to the healthy at home program.      Total time spent on discharge planning and coordination of care 40 minutes.  Discharge planning was discussed with patient, she understands and agrees with plan of care.      * Some of these notes were completed using Dragon voice recognition technology and may include unintended areas with respect to translation of word, typographical errors or primary areas which may not have been identified prior to finalization of the document.      DISCHARGE PHYSICAL EXAM     Last Recorded Vitals:  Vitals:    02/27/24 0821 02/27/24 0823 02/27/24 1048 02/27/24 1502   BP: 89/51 86/53 114/63 110/60   BP Location: Left arm Right arm     Patient Position: Lying Lying     Pulse: 52  53 52   Resp: 14      Temp: 36.7 °C (98.1 °F)  36.7 °C (98.1 °F)  36.6 °C (97.9 °F)   TempSrc:       SpO2: 92% 94% 97% 97%   Weight:       Height:           Physical Exam  PHYSICAL EXAM:   GENERAL: Laying in bed, does not appear to be in any distress.   HEENT: HEAD: Normocephalic atraumatic.  Neck: Supple.  Eyes: Pupils are reactive to direct light.   CVS: S1, S2 heard. Regular rate and rhythm  LUNGS: Clear to auscultate bilaterally. No wheezing or rhonchi appreciated.  ABDOMEN: Soft, nontender to palpate. Positive bowel sounds. No guarding or rebound appreciated.  NEUROLOGICAL: No focal neurological deficits appreciated. Cranial nerves are grossly intact.  EXTREMITIES:  No edema appreciated.  SKIN:  Grossly intact, warm and dry.    DISCHARGE MEDICATIONS        Your medication list        START taking these medications        Instructions Last Dose Given Next Dose Due   isosorbide mononitrate ER 30 mg 24 hr tablet  Commonly known as: Imdur      Take 1 tablet (30 mg) by mouth once daily. Do not crush or chew.              CHANGE how you take these medications        Instructions Last Dose Given Next Dose Due   DULoxetine 30 mg DR capsule  Commonly known as: Cymbalta  What changed:   medication strength  how much to take  when to take this  additional instructions      Take 1 capsule (30 mg) by mouth once daily. Do not crush or chew.       metoprolol succinate XL 25 mg 24 hr tablet  Commonly known as: Toprol-XL  What changed:   medication strength  how much to take  when to take this  additional instructions      Take 1 tablet (25 mg) by mouth once daily. Do not crush or chew.              CONTINUE taking these medications        Instructions Last Dose Given Next Dose Due   amLODIPine 2.5 mg tablet  Commonly known as: Norvasc           aspirin 81 mg chewable tablet           busPIRone 15 mg tablet  Commonly known as: Buspar           ezetimibe 10 mg tablet  Commonly known as: Zetia           hydrOXYzine HCL 25 mg tablet  Commonly known as: Atarax           LORazepam 1 mg  tablet  Commonly known as: Ativan           losartan 25 mg tablet  Commonly known as: Cozaar           rosuvastatin 10 mg tablet  Commonly known as: Crestor                     Where to Get Your Medications        These medications were sent to [id number] - [place], [place] - [address] [address]      Phone: [phone]   DULoxetine 30 mg DR capsule  isosorbide mononitrate ER 30 mg 24 hr tablet  metoprolol succinate XL 25 mg 24 hr tablet           OUTPATIENT FOLLOW-UP     No future appointments.

## 2024-02-27 NOTE — PROGRESS NOTES
02/27/24 1700   Referral To   [facility]  (Low-income and senior living options)     Community resource information provided to pt. Pt denies any other SW needs at this time.

## 2024-02-27 NOTE — PROGRESS NOTES
[facility] Heart Progress Note           Rounding LAKESHA/Cardiologist:  Guy Murphy, APRN-TRICIA, Dr. Saravanan Hernandez  Primary Cardiologist:  Dr Goodwin [facility]     Date:  2/27/2024  Patient:  Juana Frausto  YOB: 1940  MRN:  [id number]   Admit Date:  2/25/2024      SUBJECTIVE:    2/27/24  Patient is awake and alert and oriented.  She denies having any chest pain or shortness of breath or his family is at the bedside she states that she feels so much better today answered all of her questions at this time she is well-informed milligram do the stress test this morning  Telemetry is normal sinus rhythm no ectopy  EKG sinus bradycardia no acute changes    2/26/24  Juana Frausto is a 83 y.o.  female patient who is being at the request of Dr. Garza for inpatient consultation of angina. She was admitted on 2/25/2024.  Previous [facility] and [place] records have been reviewed in detail.   Patient with history of CAD CABG x 1 vessel May 2023, aortic valve replacement, hypertension, dyslipidemia, family history of CAD.  Patient states that yesterday she was having family stressful event went to lay down over her left side of her chest she started having pain she called her 911 by the time the ambulance got there they gave her 5 baby aspirin give her 1 nitro pain went from a 9 down to a 0.  She states that she did feel little nauseated she did not quite feel like her self they did do a chest x-ray and serial troponins.  So she was positive for chest pain, diaphoresis, nausea.  She denied fever, chills, PND, orthopnea, claudications  Trop 18  EKG nsr with PAC's P depression in V5 and 6  Back in May 2023 in [place] she had a bioprosthetic aortic valve replacement and a graft to her LAD.  She recently had an echo done at the [facility] her EF is 50%.  She is currently on a heparin drip chest pain-free she feels pretty good at this present time.        Cardiac history  1/5/24   CONCLUSIONS:   - Exam  indication: systolic and diastolic chf, Aortic stenosis   - The left ventricle is normal in size. There is mild asymmetric left ventricular   hypertrophy. Left ventricular systolic function is mildly decreased with mild LBBB    related dyssynchrony. EF = 52 ± 5% (2D 4-ch.) Grade II left ventricular diastolic    dysfunction.   - The right ventricle is normal in size. Right ventricular systolic function is   low normal.   - The left atrial cavity is severely dilated.   - The visualized aorta is dilated with a maximal dimension of 4.2 cm at the   mid-ascending aorta.   - There is moderate (2+ - 3+) tricuspid valve regurgitation.   - Bioprosthetic aortic valve. There is no aortic valve regurgitation. The peak   gradient is 23 mmHg, the mean gradient is 12 mmHg and the dimensionless valve   index is 0.47.   - Estimated right ventricular systolic pressure is 48 mmHg consistent with mild   pulmonary hypertension. Estimated right atrial pressure is 3 mmHg based on IVC   assessment.   - Exam was compared with the prior  echocardiographic exam performed on 1/12/16.    s/p Aortic valve replacement since prior.          VITALS:     Vitals:    02/26/24 0726 02/26/24 1618 02/26/24 1839 02/27/24 0320   BP: 124/65 114/61 123/68 98/55   Pulse: 51 55 58 56   Resp: 14 15 16 16   Temp: 36.9 °C (98.4 °F) 36.8 °C (98.2 °F) 36.6 °C (97.9 °F) 36.9 °C (98.4 °F)   TempSrc:       SpO2: 96% 94% 95% 96%   Weight:       Height:         No intake or output data in the 24 hours ending 02/27/24 0816    Wt Readings from Last 4 Encounters:   02/26/24 57.5 kg (126 lb 12.2 oz)       CURRENT HOSPITAL MEDICATIONS:   amLODIPine, 10 mg, oral, Nightly  aspirin, 81 mg, oral, Daily  busPIRone, 15 mg, oral, BID  DULoxetine, 20 mg, oral, Daily  ezetimibe, 10 mg, oral, Daily  hydrOXYzine HCL, 25 mg, oral, Nightly  isosorbide mononitrate ER, 30 mg, oral, Daily  losartan, 25 mg, oral, Daily  melatonin, 3 mg, oral, Daily  metoprolol succinate XL, 25 mg, oral,  Daily  metoprolol succinate XL, 25 mg, oral, Once  pantoprazole, 40 mg, oral, Daily   Or  pantoprazole, 40 mg, intravenous, Daily  polyethylene glycol, 17 g, oral, Daily  rosuvastatin, 10 mg, oral, Nightly  spironolactone, 12.5 mg, oral, q24h FLAKITO         Current Outpatient Medications   Medication Instructions    amLODIPine (Norvasc) 2.5 mg tablet 1 tablet, oral, Nightly    aspirin 81 mg chewable tablet 1 tablet, oral, Daily RT    busPIRone (BUSPAR) 15 mg, oral, 2 times daily    DULoxetine (CYMBALTA) 20 mg, oral, Daily RT    ezetimibe (ZETIA) 10 mg, oral, Daily RT    hydrOXYzine HCL (Atarax) 25 mg tablet 1 tablet, oral, Nightly    LORazepam (ATIVAN) 1 mg, oral, 2 times daily PRN    losartan (COZAAR) 25 mg, oral, Daily RT    metoprolol succinate XL (TOPROL-XL) 50 mg, oral, Daily RT    rosuvastatin (CRESTOR) 10 mg, oral, Daily        PHYSICAL EXAMINATION:   GENERAL:  Well developed, well nourished, in no acute distress.  CHEST:  Symmetric and nontender.  NEURO/PSYCH:  Alert and oriented times three with approppriate behavior and responses.  NECK:  Supple, no JVD, no bruit.  LUNGS:  Clear to auscultation bilaterally, normal respiratory effort.  HEART:  Rate and rhythm regular with no evident murmur, no gallop appreciated.        There are no rubs, clicks or heaves.  EXTREMITIES:  Warm with good color, no clubbing or cyanosis.  There is no edema noted.  PERIPHERAL VASCULAR:  Pulses present and equally palpable; 2+ throughout.      LAB DATA:     CBC:   Results from last 7 days   Lab Units 02/26/24  0426 02/25/24  1710   WBC AUTO x10*3/uL 5.2 7.7   RBC AUTO x10*6/uL 4.06 4.64   HEMOGLOBIN g/dL 11.7* 13.5   HEMATOCRIT % 35.5* 40.6   MCV fL 87 88   MCH pg 28.8 29.1   MCHC g/dL 33.0 33.3   RDW % 12.5 12.5   PLATELETS AUTO x10*3/uL 192 223     CMP:    Results from last 7 days   Lab Units 02/26/24  0426 02/25/24  1710   SODIUM mmol/L 136 136   POTASSIUM mmol/L 3.5 4.2   CHLORIDE mmol/L 105 103   CO2 mmol/L 24 22   BUN mg/dL  27* 27*   CREATININE mg/dL 1.20* 1.18*   GLUCOSE mg/dL 124* 118*   PROTEIN TOTAL g/dL 6.3* 7.5   CALCIUM mg/dL 9.3 10.5*   BILIRUBIN TOTAL mg/dL 0.4 0.6   ALK PHOS U/L 62 71   AST U/L 20 25   ALT U/L 19 25     BMP:    Results from last 7 days   Lab Units 02/26/24  0426 02/25/24  1710   SODIUM mmol/L 136 136   POTASSIUM mmol/L 3.5 4.2   CHLORIDE mmol/L 105 103   CO2 mmol/L 24 22   BUN mg/dL 27* 27*   CREATININE mg/dL 1.20* 1.18*   CALCIUM mg/dL 9.3 10.5*   GLUCOSE mg/dL 124* 118*     Magnesium:  Results from last 7 days   Lab Units 02/25/24  1710   MAGNESIUM mg/dL 1.79     Troponin:    Results from last 7 days   Lab Units 02/26/24  0426 02/25/24  1937 02/25/24  1710   TROPHS ng/L 17* 18* 16*     BNP:   Results from last 7 days   Lab Units 02/25/24  1710   BNP pg/mL 538*     Lipid Panel:         DIAGNOSTIC TESTING:   @No results found for this or any previous visit.    ECG 12 Lead    Result Date: 2/26/2024  Sinus bradycardia with sinus arrhythmia with occasional Premature ventricular complexes Left anterior fascicular block Moderate voltage criteria for LVH, may be normal variant Cannot rule out Septal infarct (cited on or before 25-FEB-2024) ST & T wave abnormality, consider lateral ischemia Abnormal ECG When compared with ECG of 26-FEB-2024 09:02, (unconfirmed) Premature ventricular complexes are now Present    XR chest 1 view    Result Date: 2/25/2024  STUDY: Chest Radiograph;  2/25/2024 at 18:10 hours. INDICATION: Chest pain. COMPARISON: None Available ACCESSION NUMBER(S): [id number] ORDERING CLINICIAN: ANA MCNAMARA TECHNIQUE:  Frontal chest was obtained at 18:10 hours. FINDINGS: CARDIOMEDIASTINAL SILHOUETTE: Cardiomediastinal silhouette is normal in size and configuration.  LUNGS: Lungs are clear.  ABDOMEN: No remarkable upper abdominal findings.  BONES: No acute osseous changes.    No acute pulmonary abnormality. Signed by Chilo Fink MD    ECG 12 lead    Result Date: 2/25/2024  Sinus rhythm with  Premature supraventricular complexes Left anterior fascicular block Left ventricular hypertrophy with repolarization abnormality Cannot rule out Septal infarct , age undetermined Abnormal ECG When compared with ECG of 23-SEP-2008 08:48, Premature supraventricular complexes are now Present Minimal criteria for Septal infarct are now Present Nonspecific T wave abnormality no longer evident in Inferior leads See ED provider note for full interpretation and clinical correlation       XR chest 1 view   Final Result   No acute pulmonary abnormality.   Signed by Chilo Fink MD      Nuclear Stress Test    (Results Pending)       No echocardiogram results found for the past 14 days    RADIOLOGY:     XR chest 1 view   Final Result   No acute pulmonary abnormality.   Signed by Chilo Fink MD      Nuclear Stress Test    (Results Pending)       PROBLEM LIST     Patient Active Problem List   Diagnosis    Chest pain    NSTEMI, initial episode of care (CMS/[facility])    Aortic valve stenosis    Cardiomyopathy (CMS/[facility])    Coronary artery disease involving native coronary artery of native heart without angina pectoris    Diaphragmatic hernia    Dysphagia    Diverticulosis    Echocardiogram shows left ventricular diastolic dysfunction    Essential hypertension, benign    GERD (gastroesophageal reflux disease)    Hyperlipidemia, mixed    Anxiety and depression    Obstructive sleep apnea (adult) (pediatric)    Primary osteoarthritis of both first carpometacarpal joints    Primary osteoarthritis of both knees    Pure hypercholesterolemia    Renal cyst    S/P arthroscopy of left knee    Stage 3b chronic kidney disease (CMS/[facility])    SVT (supraventricular tachycardia)    Tear film insufficiency    Vitamin D deficiency    Vitreous degeneration       ASSESSMENT:   Chest pain rule out ACS  History of CABG x 1 vessel LAD, AVR  Hypertension  Dyslipidemia  EF 50% on 1/5/2024          PLAN:   In summary, Mr. Juana Frausto has a history of  atherosclerotic heart disease with coronary artery bypass graft x 1 (Graft to LAD--Details not available) May 2023.  She also underwent bioprosthetic aortic valve replacement.  She has a history of hypertension and hyperlipidemia.  An echocardiogram January 5, 2024 showed estimated LV ejection fraction 50 to 55%.  Ascending aorta measured 4.2 cm.  There was moderate tricuspid regurgitation.  Normal functioning bioprosthetic aortic valve with peak gradient 23 mmHg.  Estimated RVSP was 48 mmHg.      She presented to the emergency department yesterday after a 2-minute episode of chest discomfort associated with some diaphoresis and nausea.  Patient states she was having an argument with her family.  She rated the discomfort 9 out of 10.  The discomfort resolved promptly after receiving sublingual nitroglycerin.  Initial blood pressure 144/93 mmHg.  Current vital signs are stable.  Cardiac rhythm is regular.  Lungs are clear.  No significant peripheral edema.  EKG shows normal sinus rhythm without acute ST changes.  Chest x-ray does not show any active disease.  CBC normal with exception of hemoglobin 11.7.  Comprehensive metabolic profile normal with exception of BUN 27 creatinine 1.18.  High-sensitivity troponin level 16 and 18.  BNP level 538.  She currently is resting comfortably.  No further chest discomfort.  She does note some discomfort in the left scapular area.  The symptoms seem better when the muscles were palpated.  She suspects this is related to laying on a cart for prolonged period time in the ED.  We discussed various diagnostic and treatment options.  Her symptoms were suspicious for angina pectoris, although they were very short-lived and she has not had any recurrence.  Recent single-vessel coronary artery bypass grafting surgery.  She will be scheduled for a Lexiscan myocardial perfusion study this morning.  Further recommendations pending these results.     2/27/24  Tele monitoring  Aspirin 81  daily  Cozaar 25 mg daily  Toprol XL 25-day  Crestor 10 mg daily  Zetia 10 mg daily  Aldactone 12.5 daily  Imdur 30 mg daily  Lexiscan nuclear stress test today well informed of risk versus benefits verbalized understanding would like to proceed with procedure    Further recommendations per Dr David Murphy CNP  [facility]    Of note, this documentation is completed using the Dragon Dictation system (voice recognition software). There may be spelling and/or grammatical errors that were not corrected prior to final submission.    Please do not hesitate to call with questions.  Electronically signed by IGNACIO Servin-CNP, on 2/27/2024 at 8:16 AM     I have personally interviewed and examined the patient.   I have personally and independently reviewed labs and diagnostic testing.  I have personally verified the elements of the history and physical listed above and changes, if any, are noted.   I have personally reviewed the assessment and plan as documented by ODETTE Arenas, CNP and concur.    Patient states she feels much better today.  Notes her preference to be discharged home.  She denies chest pain or shortness of breath.  Her vital signs are stable.  Oxygen saturation 96%.  Cardiac rhythm is regular.  Lungs are clear.  No significant peripheral edema.  High-sensitivity troponin level 16, 18, and 17.  BNP level 538.  Lexiscan myocardial perfusion study is negative for myocardial ischemia or infarction.  Normal LV systolic function.  Patient presented with relatively brief symptoms consistent with angina pectoris.  Borderline high-sensitivity troponin levels noted in a flat pattern.  Doubt acute coronary syndrome.  Okay to discharge home.  She will follow-up at [facility] as scheduled.

## 2024-02-28 NOTE — PROGRESS NOTES
[facility] Transitions Assessment    Patient's Address:   [address] [place] [id number]  **  If this is not the address patient will receive services - alert team and address in EMR**       Patient Contacts:  Extended Emergency Contact Information  Primary Emergency Contact: Meg Barrett  Mobile Phone: [phone]  Relation: Daughter   needed? No                                Patient's Preferred Phone: [phone]  Patient's E-mail: [email or web address]

## 2024-02-29 LAB
ATRIAL RATE: 61 BPM
P AXIS: 43 DEGREES
P OFFSET: 180 MS
P ONSET: 127 MS
PR INTERVAL: 162 MS
Q ONSET: 208 MS
QRS COUNT: 10 BEATS
QRS DURATION: 112 MS
QT INTERVAL: 470 MS
QTC CALCULATION(BAZETT): 473 MS
QTC FREDERICIA: 472 MS
R AXIS: -71 DEGREES
T AXIS: 96 DEGREES
T OFFSET: 443 MS
VENTRICULAR RATE: 61 BPM